# Patient Record
Sex: FEMALE | Race: WHITE | NOT HISPANIC OR LATINO | URBAN - METROPOLITAN AREA
[De-identification: names, ages, dates, MRNs, and addresses within clinical notes are randomized per-mention and may not be internally consistent; named-entity substitution may affect disease eponyms.]

---

## 2018-01-03 ENCOUNTER — OUTPATIENT (OUTPATIENT)
Dept: OUTPATIENT SERVICES | Facility: HOSPITAL | Age: 73
LOS: 1 days | End: 2018-01-03
Payer: MEDICARE

## 2018-01-03 DIAGNOSIS — Z98.89 OTHER SPECIFIED POSTPROCEDURAL STATES: Chronic | ICD-10-CM

## 2018-01-03 PROCEDURE — 73560 X-RAY EXAM OF KNEE 1 OR 2: CPT | Mod: 26,LT

## 2018-01-03 PROCEDURE — 73560 X-RAY EXAM OF KNEE 1 OR 2: CPT

## 2018-01-04 ENCOUNTER — OUTPATIENT (OUTPATIENT)
Dept: OUTPATIENT SERVICES | Facility: HOSPITAL | Age: 73
LOS: 1 days | End: 2018-01-04
Payer: MEDICARE

## 2018-01-04 DIAGNOSIS — Z98.89 OTHER SPECIFIED POSTPROCEDURAL STATES: Chronic | ICD-10-CM

## 2018-01-04 PROCEDURE — 73721 MRI JNT OF LWR EXTRE W/O DYE: CPT

## 2018-01-04 PROCEDURE — 73721 MRI JNT OF LWR EXTRE W/O DYE: CPT | Mod: 26,LT

## 2018-01-30 ENCOUNTER — OUTPATIENT (OUTPATIENT)
Dept: OUTPATIENT SERVICES | Facility: HOSPITAL | Age: 73
LOS: 1 days | End: 2018-01-30
Payer: MEDICARE

## 2018-01-30 DIAGNOSIS — Z98.89 OTHER SPECIFIED POSTPROCEDURAL STATES: Chronic | ICD-10-CM

## 2018-01-30 PROCEDURE — 73560 X-RAY EXAM OF KNEE 1 OR 2: CPT | Mod: 26,LT

## 2018-01-30 PROCEDURE — 73560 X-RAY EXAM OF KNEE 1 OR 2: CPT

## 2018-04-03 ENCOUNTER — OUTPATIENT (OUTPATIENT)
Dept: OUTPATIENT SERVICES | Facility: HOSPITAL | Age: 73
LOS: 1 days | End: 2018-04-03
Payer: MEDICARE

## 2018-04-03 DIAGNOSIS — Z98.89 OTHER SPECIFIED POSTPROCEDURAL STATES: Chronic | ICD-10-CM

## 2018-04-03 PROCEDURE — 73560 X-RAY EXAM OF KNEE 1 OR 2: CPT | Mod: 26,LT

## 2018-04-03 PROCEDURE — 73560 X-RAY EXAM OF KNEE 1 OR 2: CPT

## 2018-09-21 ENCOUNTER — HOSPITAL LABORATORY (OUTPATIENT)
Dept: OTHER | Facility: CLINIC | Age: 73
End: 2018-09-21

## 2018-09-21 LAB
GRAM STN SPEC: ABNORMAL
GRAM STN SPEC: ABNORMAL
Lab: ABNORMAL
SPECIMEN SOURCE: ABNORMAL

## 2018-09-23 LAB
BACTERIA SPEC CULT: ABNORMAL
Lab: ABNORMAL
SPECIMEN SOURCE: ABNORMAL

## 2019-01-25 ENCOUNTER — THERAPY VISIT (OUTPATIENT)
Dept: PHYSICAL THERAPY | Facility: CLINIC | Age: 74
End: 2019-01-25
Payer: MEDICARE

## 2019-01-25 DIAGNOSIS — M54.2 NECK PAIN: Primary | ICD-10-CM

## 2019-01-25 PROCEDURE — 97161 PT EVAL LOW COMPLEX 20 MIN: CPT | Mod: GP | Performed by: PHYSICAL THERAPIST

## 2019-01-25 PROCEDURE — G8982 BODY POS GOAL STATUS: HCPCS | Mod: GP | Performed by: PHYSICAL THERAPIST

## 2019-01-25 PROCEDURE — 97110 THERAPEUTIC EXERCISES: CPT | Mod: GP | Performed by: PHYSICAL THERAPIST

## 2019-01-25 PROCEDURE — G8981 BODY POS CURRENT STATUS: HCPCS | Mod: GP | Performed by: PHYSICAL THERAPIST

## 2019-01-25 NOTE — PROGRESS NOTES
[facility] Initial Evaluation  Subjective:  C/C:  Intermittent left sharp/tingling neck pain that will radiate down into interscapular area, up into left head, upper trap, left upper arm.  Neck is very stiff.  Worse with sitting, better with Advil.  Denies numbness, no complaint of dizziness.  Sleep is disturbed.  Hx:  12/10/18-Insideous onset of left neck pain.  Can give no reason or causal factor.  Saw Dr. Prasad and was referred to PT.  No new imaging at this time.  PMH:  5 yrs ago suffered an episode of severe neck pain, then moved into both knees that jhad TKAs.  Went into hospital With elevated WBC.  Had TKA revisions and swelling from knee was cultured which came back (-).  4-5 months later,suffered a second episode.  Was diagnosed with pseudo gout at that time.  Taking Prednisone for several days gave good relief.  Since that time has suffered other episodes that have responded to prednisone.  This episode was unchaged by prednisone.  10/17/18-Underwent fusion to left foot, bunion repair, tendon repair.  General health:  Good.  Pt works 1 day/wk.                                Objective:  Standing Alignment:    Cervical/Thoracic:  Cervical spine lateral flex L and forward head  Shoulder/UE:  Elevated scapula L, elevated scapula R and rounded shoulders                                  Cervical/Thoracic Evaluation    AROM:  AROM Cervical:    Flexion:            80%  Extension:       50% (+)  Rotation:         Left: 50% (+)     Right: 55% (+)  Side Bend:      Left: 30%     Right:  40% (+)    Strength: Noted tingling in mid left neck post right rotation.  Headaches: cervical  Cervical Myotomes:        C4 (shrug):  Right: 5  C5 (Deltoid):  Left: 5    Right: 5  C6 (Biceps):  Left: 5    Right: 5  C7 (Triceps):  Left: 5    Right: 5    T1 (Intrinsics): Left: 5    Right: 5  DTR's:    C5 (Biceps):  Left:  2  Right:  2     C6 (Brachioradialis):  Left:  2  Right:  2     C7 (Triceps):  Left:  2   Right:  2              Cord Sign:      Cord sign negative for:  Mcmanus left; Mcmanus right; Scapularthoracic left or Scapularthoracic right                                          General     ROS    Assessment/Plan:    Patient is a 73 year old female with cervical complaints.    Patient has the following significant findings with corresponding treatment plan.                Diagnosis 1:  Neck pain  Pain -  manual therapy, self management, education and home program  Decreased ROM/flexibility - manual therapy and therapeutic exercise  Decreased joint mobility - manual therapy and therapeutic exercise  Decreased strength - therapeutic exercise and therapeutic activities  Impaired muscle performance - neuro re-education  Decreased function - therapeutic activities  Impaired posture - neuro re-education    Therapy Evaluation Codes:   1) History comprised of:   Personal factors that impact the plan of care:      None.    Comorbidity factors that impact the plan of care are:      High blood pressure and Osteoarthritis.     Medications impacting care: Anti-inflammatory and Steroids.  2) Examination of Body Systems comprised of:   Body structures and functions that impact the plan of care:      Cervical spine.   Activity limitations that impact the plan of care are:      Sitting.  3) Clinical presentation characteristics are:   Stable/Uncomplicated.  4) Decision-Making    Low complexity using standardized patient assessment instrument and/or measureable assessment of functional outcome.  Cumulative Therapy Evaluation is: Low complexity.    Previous and current functional limitations:  (See Goal Flow Sheet for this information)    Short term and Long term goals: (See Goal Flow Sheet for this information)     Communication ability:  Patient appears to be able to clearly communicate and understand verbal and written communication and follow directions correctly.  Treatment Explanation - The following has been discussed with  the patient:   RX ordered/plan of care  Anticipated outcomes  Possible risks and side effects  This patient would benefit from PT intervention to resume normal activities.   Rehab potential is good.    Frequency:  1 X week, once daily  Duration:  for 8 weeks  Discharge Plan:  Achieve all LTG.  Independent in home treatment program.  Reach maximal therapeutic benefit.    Please refer to the daily flowsheet for treatment today, total treatment time and time spent performing 1:1 timed codes.

## 2019-01-25 NOTE — LETTER
DEPARTMENT OF HEALTH AND HUMAN SERVICES  CENTERS FOR MEDICARE & MEDICAID SERVICES    PLAN/UPDATED PLAN OF PROGRESS FOR OUTPATIENT REHABILITATION    PATIENTS NAME:  Alexus Lynch   : 1945    PROVIDER NUMBER:    [id number]    HICN:  [id number]    PROVIDER NAME: [facility]    MEDICAL RECORD NUMBER: [id number]     START OF CARE DATE:  SOC Date: 19   TYPE:  PT    PRIMARY/TREATMENT DIAGNOSIS: (Pertinent Medical Diagnosis)  Neck pain    VISITS FROM START OF CARE:  Rxs Used: 1     [place] [facility] Initial Evaluation  Subjective:  C/C:  Intermittent left sharp/tingling neck pain that will radiate down into interscapular area, up into left head, upper trap, left upper arm.  Neck is very stiff.  Worse with sitting, better with Advil.  Denies numbness, no complaint of dizziness.  Sleep is disturbed.  Hx:  12/10/18-Insideous onset of left neck pain.  Can give no reason or causal factor.  Saw Dr. Prasad and was referred to PT.  No new imaging at this time.  PMH:  5 yrs ago suffered an episode of severe neck pain, then moved into both knees that jhad TKAs.  Went into hospital With elevated WBC.  Had TKA revisions and swelling from knee was cultured which came back (-).  4-5 months later,suffered a second episode.  Was diagnosed with pseudo gout at that time.  Taking Prednisone for several days gave good relief.  Since that time has suffered other episodes that have responded to prednisone.  This episode was unchaged by prednisone.  10/17/18-Underwent fusion to left foot, bunion repair, tendon repair.  General health:  Good.  Pt works 1 day/wk.Pertinent medical history includes:  Anemia, asthma, high blood pressure, implanted devices, migraines/headaches and osteoarthritis (Pain at night/rest, numbness/tingling).  Medical allergies: yes (Celebrex, sulfur).  Other surgeries include:  Orthopedic surgery.  Current medications:  High blood pressure medication  and anti-inflammatory.  Employment status: Retired.      Objective:  Standing Alignment:    Cervical/Thoracic:  Cervical spine lateral flex L and forward head  Shoulder/UE:  Elevated scapula L, elevated scapula R and rounded shoulders    Cervical/Thoracic Evaluation  AROM:  AROM Cervical:  Flexion:            80%  Extension:       50% (+)  Rotation:         Left: 50% (+)     Right: 55% (+)  Side Bend:      Left: 30%     Right:  40% (+)    PATIENTS NAME:  Alexus Lynch   : 1945    Strength: Noted tingling in mid left neck post right rotation.  Headaches: cervical  Cervical Myotomes:    C4 (shrug):  Right: 5  C5 (Deltoid):  Left: 5    Right: 5  C6 (Biceps):  Left: 5    Right: 5  C7 (Triceps):  Left: 5    Right: 5  T1 (Intrinsics): Left: 5    Right: 5  DTR's:    C5 (Biceps):  Left:  2  Right:  2     C6 (Brachioradialis):  Left:  2  Right:  2     C7 (Triceps):  Left:  2  Right:  2  Cord Sign:    Cord sign negative for:  Mcmanus left; Mcmanus right; Scapularthoracic left or Scapularthoracic right     General   ROS    Assessment/Plan:    Patient is a 73 year old female with cervical complaints.    Patient has the following significant findings with corresponding treatment plan.                Diagnosis 1:  Neck pain  Pain -  manual therapy, self management, education and home program  Decreased ROM/flexibility - manual therapy and therapeutic exercise  Decreased joint mobility - manual therapy and therapeutic exercise  Decreased strength - therapeutic exercise and therapeutic activities  Impaired muscle performance - neuro re-education  Decreased function - therapeutic activities  Impaired posture - neuro re-education    Therapy Evaluation Codes:   1) History comprised of:   Personal factors that impact the plan of care:      None.    Comorbidity factors that impact the plan of care are:      High blood pressure and Osteoarthritis.     Medications impacting care: Anti-inflammatory and Steroids.  2) Examination of  "Body Systems comprised of:   Body structures and functions that impact the plan of care:      Cervical spine.   Activity limitations that impact the plan of care are:      Sitting.  3) Clinical presentation characteristics are:   Stable/Uncomplicated.  4) Decision-Making    Low complexity using standardized patient assessment instrument and/or measureable assessment of functional outcome.  Cumulative Therapy Evaluation is: Low complexity.      PATIENTS NAME:  Alexus Lynch   : 1945    Communication ability:  Patient appears to be able to clearly communicate and understand verbal and written communication and follow directions correctly.  Treatment Explanation - The following has been discussed with the patient:   RX ordered/plan of care  Anticipated outcomes  Possible risks and side effects  This patient would benefit from PT intervention to resume normal activities.   Rehab potential is good.  Frequency:  1 X week, once daily  Duration:  for 8 weeks  Discharge Plan:  Achieve all LTG.  Independent in home treatment program.  Reach maximal therapeutic benefit.      Caregiver Signature/Credentials _____________________________ Date ________       Treating Provider: Corinne Serrano, PT, ScD, MOMT     I have reviewed and certified the need for these services and plan of treatment while under my care.        PHYSICIAN'S SIGNATURE:  ______________________________________  Date___________                       Tyler Prasad MD    Certification period:  Beginning of Cert date period: 19 to  End of Cert period date: 19     Functional Level Progress Report: Please see attached \"Goal Flow sheet for Functional level.\"    ____X____Continue Services or________ DC Services                Service dates: From  SOC Date: 19 date to present                         "

## 2019-01-28 ENCOUNTER — THERAPY VISIT (OUTPATIENT)
Dept: PHYSICAL THERAPY | Facility: CLINIC | Age: 74
End: 2019-01-28
Payer: MEDICARE

## 2019-01-28 DIAGNOSIS — M54.2 NECK PAIN: ICD-10-CM

## 2019-01-28 PROCEDURE — 97140 MANUAL THERAPY 1/> REGIONS: CPT | Mod: GP | Performed by: PHYSICAL THERAPIST

## 2019-01-28 PROCEDURE — 97035 APP MDLTY 1+ULTRASOUND EA 15: CPT | Mod: GP | Performed by: PHYSICAL THERAPIST

## 2019-01-28 PROCEDURE — 97110 THERAPEUTIC EXERCISES: CPT | Mod: GP | Performed by: PHYSICAL THERAPIST

## 2019-01-29 NOTE — PROGRESS NOTES
[facility] Initial Evaluation  Subjective:                                       Pertinent medical history includes:  Anemia, asthma, high blood pressure, implanted devices, migraines/headaches and osteoarthritis (Pain at night/rest, numbness/tingling).  Medical allergies: yes (Celebrex, sulfur).  Other surgeries include:  Orthopedic surgery.  Current medications:  High blood pressure medication and anti-inflammatory.    Employment status: Retired.                                  Objective:  System    Physical Exam    General     ROS    Assessment/Plan:

## 2019-02-05 ENCOUNTER — THERAPY VISIT (OUTPATIENT)
Dept: PHYSICAL THERAPY | Facility: CLINIC | Age: 74
End: 2019-02-05
Payer: MEDICARE

## 2019-02-05 DIAGNOSIS — M54.2 NECK PAIN: ICD-10-CM

## 2019-02-05 PROCEDURE — 97140 MANUAL THERAPY 1/> REGIONS: CPT | Mod: GP | Performed by: PHYSICAL THERAPIST

## 2019-02-05 PROCEDURE — 97035 APP MDLTY 1+ULTRASOUND EA 15: CPT | Mod: GP | Performed by: PHYSICAL THERAPIST

## 2019-02-05 PROCEDURE — 97110 THERAPEUTIC EXERCISES: CPT | Mod: GP | Performed by: PHYSICAL THERAPIST

## 2019-02-12 ENCOUNTER — THERAPY VISIT (OUTPATIENT)
Dept: PHYSICAL THERAPY | Facility: CLINIC | Age: 74
End: 2019-02-12
Payer: MEDICARE

## 2019-02-12 DIAGNOSIS — M54.2 NECK PAIN: ICD-10-CM

## 2019-02-12 PROCEDURE — 97140 MANUAL THERAPY 1/> REGIONS: CPT | Mod: GP | Performed by: PHYSICAL THERAPIST

## 2019-02-12 PROCEDURE — 97110 THERAPEUTIC EXERCISES: CPT | Mod: GP | Performed by: PHYSICAL THERAPIST

## 2019-02-12 PROCEDURE — 97035 APP MDLTY 1+ULTRASOUND EA 15: CPT | Mod: GP | Performed by: PHYSICAL THERAPIST

## 2019-02-28 ENCOUNTER — THERAPY VISIT (OUTPATIENT)
Dept: PHYSICAL THERAPY | Facility: CLINIC | Age: 74
End: 2019-02-28
Payer: MEDICARE

## 2019-02-28 DIAGNOSIS — M54.2 NECK PAIN: ICD-10-CM

## 2019-02-28 PROCEDURE — 97112 NEUROMUSCULAR REEDUCATION: CPT | Mod: GP | Performed by: PHYSICAL THERAPIST

## 2019-02-28 PROCEDURE — G8982 BODY POS GOAL STATUS: HCPCS | Mod: GP | Performed by: PHYSICAL THERAPIST

## 2019-02-28 PROCEDURE — G8983 BODY POS D/C STATUS: HCPCS | Mod: GP | Performed by: PHYSICAL THERAPIST

## 2019-02-28 PROCEDURE — 97140 MANUAL THERAPY 1/> REGIONS: CPT | Mod: GP | Performed by: PHYSICAL THERAPIST

## 2019-02-28 PROCEDURE — 97035 APP MDLTY 1+ULTRASOUND EA 15: CPT | Mod: GP | Performed by: PHYSICAL THERAPIST

## 2019-02-28 NOTE — PROGRESS NOTES
Subjective:  HPI                    Objective:  System    Physical Exam    General     ROS    Assessment/Plan:    DISCHARGE REPORT    Progress reporting period is from 01/25/19 to 2/28/19.       SUBJECTIVE  Subjective changes noted by patient:  .  Subjective: Pt feels that she is significantly better.  Has returned from traveling to [place] which she tolerated well.  Is able to tolerate ex well.  Still will note mild tingling in left lat neck, but improved.  Feels she can mange neck pain with current HEP.    Current pain level is   .     Previous pain level was   Initial Pain level: 6/10.   Changes in function:  Yes (See Goal flowsheet attached for changes in current functional level)  Adverse reaction to treatment or activity: None    OBJECTIVE  Changes noted in objective findings:    Objective: AROM of cervical spine: Flex-90%, ext-40%, R rot-90%, L rot-65-70%, SB-70% B.  Pt is indepednent with HEP.       ASSESSMENT/PLAN  Updated problem list and treatment plan: Diagnosis 1:  Neck pain  Pain -  US, manual therapy, self management, education and home program  Decreased ROM/flexibility - manual therapy and therapeutic exercise  Decreased joint mobility - manual therapy and therapeutic exercise  Decreased strength - therapeutic exercise and therapeutic activities  Impaired muscle performance - neuro re-education  Decreased function - therapeutic activities  Impaired posture - neuro re-education  STG/LTGs have been met or progress has been made towards goals:  Yes (See Goal flow sheet completed today.)  Assessment of Progress: The patient's condition is improving.  Self Management Plans:  Patient is independent in a home treatment program.  Patient is independent in self management of symptoms.    Alexus continues to require the following intervention to meet STG and LTG's:  PT    Recommendations:  This patient is ready to be discharged from therapy and continue their home treatment program.    Please refer to the  daily flowsheet for treatment today, total treatment time and time spent performing 1:1 timed codes.

## 2019-02-28 NOTE — LETTER
[facility]  [address]  [facility] [address]  [phone]    2019    Re: Alexus Lynch   :   1945  MRN:  [id number]   REFERRING PHYSICIAN:   Tyler Prasad    [facility]    Date of Initial Evaluation:  2019  Visits:  Rxs Used: 5  Reason for Referral:  Neck pain    DISCHARGE REPORT  Progress reporting period is from 19 to 19.       SUBJECTIVE  Subjective changes noted by patient:  .  Subjective: Pt feels that she is significantly better.  Has returned from traveling to [place] which she tolerated well.  Is able to tolerate ex well.  Still will note mild tingling in left lat neck, but improved.  Feels she can mange neck pain with current HEP.    Current pain level is   .     Previous pain level was   Initial Pain level: 6/10.   Changes in function:  Yes (See Goal flowsheet attached for changes in current functional level)  Adverse reaction to treatment or activity: None    OBJECTIVE  Changes noted in objective findings:    Objective: AROM of cervical spine: Flex-90%, ext-40%, R rot-90%, L rot-65-70%, SB-70% B.  Pt is indepednent with HEP.       ASSESSMENT/PLAN  Updated problem list and treatment plan: Diagnosis 1:  Neck pain  Pain -  US, manual therapy, self management, education and home program  Decreased ROM/flexibility - manual therapy and therapeutic exercise  Decreased joint mobility - manual therapy and therapeutic exercise  Decreased strength - therapeutic exercise and therapeutic activities  Impaired muscle performance - neuro re-education  Decreased function - therapeutic activities  Impaired posture - neuro re-education  STG/LTGs have been met or progress has been made towards goals:  Yes (See Goal flow sheet completed today.)  Assessment of Progress: The patient's condition is improving.  Self Management Plans:  Patient is independent in a home treatment  program.  Patient is independent in self management of symptoms.  Alexus continues to require the following intervention to meet STG and LTG's:  PT        Recommendations:  This patient is ready to be discharged from therapy and continue their home treatment program.    Thank you for your referral.    INQUIRIES  Therapist: Corinne Serrano PT ScD [facility]  [address] [place] [address] [id number]  [facility] [address]  Phone: [phone]  Fax: [phone]

## 2021-06-02 ENCOUNTER — THERAPY VISIT (OUTPATIENT)
Dept: PHYSICAL THERAPY | Facility: CLINIC | Age: 76
End: 2021-06-02
Payer: COMMERCIAL

## 2021-06-02 DIAGNOSIS — M54.2 NECK PAIN: Primary | ICD-10-CM

## 2021-06-02 PROCEDURE — 97110 THERAPEUTIC EXERCISES: CPT | Mod: GP | Performed by: PHYSICAL THERAPIST

## 2021-06-02 PROCEDURE — 97161 PT EVAL LOW COMPLEX 20 MIN: CPT | Mod: GP | Performed by: PHYSICAL THERAPIST

## 2021-06-02 NOTE — LETTER
"[address]  [facility] [address]  [phone]    Jeannie 3, 2021    Re: Alexus Lynch   :   1945  MRN:  [id number]   REFERRING PHYSICIAN:   Jeff SANCHEZ [facility]  Date of Initial Evaluation:  2021  Visits:  Rxs Used: 8 per MD  Reason for Referral:  Neck pain    Physical Therapy Initial Evaluation  Subjective:  Patient presents to PT for treatment of neck pain with referral dated 2021.  She reports fluctuating neck pain for several years but recent worsening of symptoms began about 4 weeks ago, without precipitating event.  Patient complains of bilateral neck pain rated 6/10 which radiates up into her head, with daily headaches.  Her neck mobility is significantly limited.  Patient also has bilateral RCT's and anticipates eventual TSA surgeries.  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  She suffers from pseudo gout which often presents as neck pain before traveling to other joints.  The prednisone she takes for this did not affect current symptoms.  Patient reports recent MRI showed \"only 2 discs left at cervical spine, with bottom 3 vertebra fused and arthritis at C3-C4 which is putting pressure on the spinal cord\".  The history is provided by the patient.   Patient Health History  Alexus Lynch being seen for neck.   Date of Onset: years ago, but 4 weeks ago most recentlly.   Problem occurred: arthritis   Pain is reported as 6/10 on pain scale.  General health as reported by patient is fair.  Pertinent medical history includes: high blood pressure, migraines/headaches and osteoarthritis.   Other medical allergies details: celebrex.   Surgeries include:  Orthopedic surgery. Other surgery history details: B TKA, left foot fusion.    Current medications:  Anti-inflammatory, pain medication and high blood pressure medication.    Current occupation is retired, part time " work at [employer].   Primary job tasks include:  Driving and lifting/carrying.      Objective:  Standing Alignment:    Cervical/Thoracic:  Forward head (significant step off at cervical spine)  Shoulder/UE:  Depressed scapula R  scoliosis  Cervical/Thoracic Evaluation  AROM:  AROM Cervical:  Re: Alexus Lynch   :   1945    Flexion:            Min loss with right greater than left neck pain which radiates up head  Extension:       Max loss with increased NP  Rotation:         Left: mod loss with tightness L neck     Right: max loss with right NP  Side Bend:      Left: mod/max loss with stirrness complaint     Right:  Max loss with right NP  Headaches: cervical  DTR's:  normal  Cervical Palpation:    Tenderness present at Left:    Upper Trap; Levator; Erector Spinae and Suboccipitals  Tenderness present at Right:    Upper Trap; Levator; Erector Spinae and Suboccipitals  Functional Tests:    Core strength and proprioception:  Unable to elevate bilateral UE above shoulder level due to RCT's     Assessment/Plan:    Patient is a 76 year old female with cervical complaints.    Patient has the following significant findings with corresponding treatment plan.                Diagnosis 1:  Neck pain with significant degenerative changes  Pain -  manual therapy, self management, education and home program  Decreased ROM/flexibility - manual therapy and therapeutic exercise  Decreased function - therapeutic activities    Therapy Evaluation Codes:   1) History comprised of:   Personal factors that impact the plan of care:      None.    Comorbidity factors that impact the plan of care are:      None.     Medications impacting care: None.  2) Examination of Body Systems comprised of:   Body structures and functions that impact the plan of care:      Cervical spine.   Activity limitations that impact the plan of care are:      Lifting, Working and Sleeping.  3) Clinical presentation characteristics  are:   Stable/Uncomplicated.  4) Decision-Making    Low complexity using standardized patient assessment instrument and/or measureable assessment of functional outcome.  Cumulative Therapy Evaluation is: Low complexity.    Previous and current functional limitations:  (See Goal Flow Sheet for this information)    Short term and Long term goals: (See Goal Flow Sheet for this information)     Communication ability:  Patient appears to be able to clearly communicate and understand verbal and written communication and follow directions correctly.  Treatment Explanation - The following has been discussed with the patient:   RX ordered/plan of care  Anticipated outcomes; Possible risks and side effects  This patient would benefit from PT intervention to resume normal activities.   Rehab potential is good.  Re: Alexus Lynch   :   1945    Frequency:  1 X week, once daily  Duration:  for 8 weeks  Discharge Plan:  Achieve all LTG.  Independent in home treatment program.  Reach maximal therapeutic benefit.    Attestation signed by Yeo, Albert, MD at 2021  5:31 PM:  Physician Attestation   I, Albert Yeo, have reviewed and discussed with the advanced practice provider their history, physical and plan for Alexus Lynch. I did not participate in a shared visit by interviewing or examining the patient and this should be billed as an advanced practice provider only visit.    Albert Yeo  Date of Service (when I saw the patient): I did not personally see this patient today.      Thank you for your referral.    INQUIRIES  Therapist:Madelin Mariano, PT [facility] [address] [address]  Phone: [phone]  Fax: [phone]

## 2021-06-02 NOTE — PROGRESS NOTES
"Physical Therapy Initial Evaluation  Subjective:  Patient presents to PT for treatment of neck pain with referral dated 5/27/2021.  She reports fluctuating neck pain for several years but recent worsening of symptoms began about 4 weeks ago, without precipitating event.  Patient complains of bilateral neck pain rated 6/10 which radiates up into her head, with daily headaches.  Her neck mobility is significantly limited.  Patient also has bilateral RCT's and anticipates eventual TSA surgeries.  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  She suffers from pseudo gout which often presents as neck pain before traveling to other joints.  The prednisone she takes for this did not affect current symptoms.  Patient reports recent MRI showed \"only 2 discs left at cervical spine, with bottom 3 vertebra fused and arthritis at C3-C4 which is putting pressure on the spinal cord\".    The history is provided by the patient.   Patient Health History  Alexus Lynch being seen for neck.     Date of Onset: years ago, but 4 weeks ago most recentlly.   Problem occurred: arthritis   Pain is reported as 6/10 on pain scale.  General health as reported by patient is fair.  Pertinent medical history includes: high blood pressure, migraines/headaches and osteoarthritis.      Other medical allergies details: celebrex.   Surgeries include:  Orthopedic surgery. Other surgery history details: B TKA, left foot fusion.    Current medications:  Anti-inflammatory, pain medication and high blood pressure medication.    Current occupation is retired, part time work at [employer].   Primary job tasks include:  Driving and lifting/carrying.                                    Objective:  Standing Alignment:    Cervical/Thoracic:  Forward head (significant step off at cervical spine)  Shoulder/UE:  Depressed scapula R                    scoliosis              Cervical/Thoracic Evaluation    AROM:  AROM Cervical:    Flexion:        "     Min loss with right greater than left neck pain which radiates up head  Extension:       Max loss with increased NP  Rotation:         Left: mod loss with tightness L neck     Right: max loss with right NP  Side Bend:      Left: mod/max loss with stirrness complaint     Right:  Max loss with right NP      Headaches: cervical    DTR's:  normal            Cervical Palpation:    Tenderness present at Left:    Upper Trap; Levator; Erector Spinae and Suboccipitals  Tenderness present at Right:    Upper Trap; Levator; Erector Spinae and Suboccipitals  Functional Tests:    Core strength and proprioception:  Unable to elevate bilateral UE above shoulder level due to RCT's                                                General     ROS    Assessment/Plan:    Patient is a 76 year old female with cervical complaints.    Patient has the following significant findings with corresponding treatment plan.                Diagnosis 1:  Neck pain with significant degenerative changes  Pain -  manual therapy, self management, education and home program  Decreased ROM/flexibility - manual therapy and therapeutic exercise  Decreased function - therapeutic activities    Therapy Evaluation Codes:   1) History comprised of:   Personal factors that impact the plan of care:      None.    Comorbidity factors that impact the plan of care are:      None.     Medications impacting care: None.  2) Examination of Body Systems comprised of:   Body structures and functions that impact the plan of care:      Cervical spine.   Activity limitations that impact the plan of care are:      Lifting, Working and Sleeping.  3) Clinical presentation characteristics are:   Stable/Uncomplicated.  4) Decision-Making    Low complexity using standardized patient assessment instrument and/or measureable assessment of functional outcome.  Cumulative Therapy Evaluation is: Low complexity.    Previous and current functional limitations:  (See Goal Flow Sheet for  this information)    Short term and Long term goals: (See Goal Flow Sheet for this information)     Communication ability:  Patient appears to be able to clearly communicate and understand verbal and written communication and follow directions correctly.  Treatment Explanation - The following has been discussed with the patient:   RX ordered/plan of care  Anticipated outcomes  Possible risks and side effects  This patient would benefit from PT intervention to resume normal activities.   Rehab potential is good.    Frequency:  1 X week, once daily  Duration:  for 8 weeks  Discharge Plan:  Achieve all LTG.  Independent in home treatment program.  Reach maximal therapeutic benefit.    Please refer to the daily flowsheet for treatment today, total treatment time and time spent performing 1:1 timed codes.

## 2021-06-09 ENCOUNTER — THERAPY VISIT (OUTPATIENT)
Dept: PHYSICAL THERAPY | Facility: CLINIC | Age: 76
End: 2021-06-09
Payer: COMMERCIAL

## 2021-06-09 DIAGNOSIS — M54.2 NECK PAIN: ICD-10-CM

## 2021-06-09 PROCEDURE — 97140 MANUAL THERAPY 1/> REGIONS: CPT | Mod: GP | Performed by: PHYSICAL THERAPIST

## 2021-06-09 PROCEDURE — 97110 THERAPEUTIC EXERCISES: CPT | Mod: GP | Performed by: PHYSICAL THERAPIST

## 2021-06-18 ENCOUNTER — THERAPY VISIT (OUTPATIENT)
Dept: PHYSICAL THERAPY | Facility: CLINIC | Age: 76
End: 2021-06-18
Payer: COMMERCIAL

## 2021-06-18 DIAGNOSIS — M54.2 NECK PAIN: ICD-10-CM

## 2021-06-18 PROCEDURE — 97140 MANUAL THERAPY 1/> REGIONS: CPT | Mod: GP | Performed by: PHYSICAL THERAPIST

## 2021-06-18 PROCEDURE — 97110 THERAPEUTIC EXERCISES: CPT | Mod: GP | Performed by: PHYSICAL THERAPIST

## 2021-06-25 ENCOUNTER — THERAPY VISIT (OUTPATIENT)
Dept: PHYSICAL THERAPY | Facility: CLINIC | Age: 76
End: 2021-06-25
Payer: COMMERCIAL

## 2021-06-25 DIAGNOSIS — M54.2 NECK PAIN: ICD-10-CM

## 2021-06-25 PROCEDURE — 97110 THERAPEUTIC EXERCISES: CPT | Mod: GP | Performed by: PHYSICAL THERAPIST

## 2021-06-25 PROCEDURE — 97140 MANUAL THERAPY 1/> REGIONS: CPT | Mod: GP | Performed by: PHYSICAL THERAPIST

## 2021-07-05 ENCOUNTER — THERAPY VISIT (OUTPATIENT)
Dept: PHYSICAL THERAPY | Facility: CLINIC | Age: 76
End: 2021-07-05
Payer: COMMERCIAL

## 2021-07-05 DIAGNOSIS — M54.2 NECK PAIN: ICD-10-CM

## 2021-07-05 PROCEDURE — 97035 APP MDLTY 1+ULTRASOUND EA 15: CPT | Mod: GP | Performed by: PHYSICAL THERAPIST

## 2021-07-05 PROCEDURE — 97110 THERAPEUTIC EXERCISES: CPT | Mod: GP | Performed by: PHYSICAL THERAPIST

## 2021-07-05 PROCEDURE — 97140 MANUAL THERAPY 1/> REGIONS: CPT | Mod: GP | Performed by: PHYSICAL THERAPIST

## 2021-07-16 ENCOUNTER — THERAPY VISIT (OUTPATIENT)
Dept: PHYSICAL THERAPY | Facility: CLINIC | Age: 76
End: 2021-07-16
Payer: COMMERCIAL

## 2021-07-16 DIAGNOSIS — M54.2 NECK PAIN: ICD-10-CM

## 2021-07-16 PROCEDURE — 97140 MANUAL THERAPY 1/> REGIONS: CPT | Mod: GP | Performed by: PHYSICAL THERAPIST

## 2021-07-16 PROCEDURE — 97110 THERAPEUTIC EXERCISES: CPT | Mod: GP | Performed by: PHYSICAL THERAPIST

## 2021-07-16 PROCEDURE — 97035 APP MDLTY 1+ULTRASOUND EA 15: CPT | Mod: GP | Performed by: PHYSICAL THERAPIST

## 2021-07-21 ENCOUNTER — THERAPY VISIT (OUTPATIENT)
Dept: PHYSICAL THERAPY | Facility: CLINIC | Age: 76
End: 2021-07-21
Payer: COMMERCIAL

## 2021-07-21 DIAGNOSIS — M54.2 NECK PAIN: ICD-10-CM

## 2021-07-21 PROCEDURE — 97035 APP MDLTY 1+ULTRASOUND EA 15: CPT | Mod: GP | Performed by: PHYSICAL THERAPIST

## 2021-07-21 PROCEDURE — 97140 MANUAL THERAPY 1/> REGIONS: CPT | Mod: GP | Performed by: PHYSICAL THERAPIST

## 2021-07-21 PROCEDURE — 97110 THERAPEUTIC EXERCISES: CPT | Mod: GP | Performed by: PHYSICAL THERAPIST

## 2021-07-28 ENCOUNTER — THERAPY VISIT (OUTPATIENT)
Dept: PHYSICAL THERAPY | Facility: CLINIC | Age: 76
End: 2021-07-28
Payer: COMMERCIAL

## 2021-07-28 DIAGNOSIS — M54.2 NECK PAIN: ICD-10-CM

## 2021-07-28 PROCEDURE — 97035 APP MDLTY 1+ULTRASOUND EA 15: CPT | Mod: GP | Performed by: PHYSICAL THERAPIST

## 2021-07-28 PROCEDURE — 97140 MANUAL THERAPY 1/> REGIONS: CPT | Mod: GP | Performed by: PHYSICAL THERAPIST

## 2021-07-28 PROCEDURE — 97110 THERAPEUTIC EXERCISES: CPT | Mod: GP | Performed by: PHYSICAL THERAPIST

## 2021-07-28 NOTE — LETTER
DANIEL [address]  [facility] [address]  [phone]  2021    Re: Alexus Lynch   :   1945  MRN:  [id number]   REFERRING PHYSICIAN:   Jeff SANCHEZ [facility]  Date of Initial Evaluation:  2021  Visits:  Rxs Used: 8  Reason for Referral:  Neck pain    DISCHARGE REPORT  Progress reporting period is from 2021 to 2021.  Alexus has been seen in PT 8 times per referral for treatment of neck pain/arhtritis.  Treatment has included US, gentle manual therapy and gentle HEP.  Neck pain and headaches have fluctuated over the course of treatment.   Patient is under care of rheumatologist and takes prednisone.    SUBJECTIVE  Subjective: Overall fluctuating neck relief and exacerbation.  Had significant neck pain 3 days ago, perhaps related to increased increased activity - lifting, reachin, pulling.  Today feeling pretty good.    Current Pain level: 3/10.     Previous pain level was 6/10   Initial Pain level: 6/10.   Changes in function:  See goal flow sheet  Adverse reaction to treatment or activity: None    OBJECTIVE  Changes noted in objective findings:    Objective: Cervical ROM:  flexion no loss - relieving.  ext - mod loss without complaint, SB L mod loss with stiffness, SB R max loss with stiffness, Rot L mild loss without complaint, Rot R mild loss with R NP.  UE elevation limited to 30 deg on right, 80 deg on left, with scapular substitution     ASSESSMENT/PLAN  STG/LTGs have been met or progress has been made towards goals: See goal flow sheet  Assessment of Progress: The patient's condition has potential to improve.  Self Management Plans:  Patient is independent in a home treatment program.  Alexus continues to require the following intervention to meet STG and LTG's:  PT intervention is no longer required to meet STG/LTG.    Recommendations:  This patient is ready to be discharged from  therapy and continue their home treatment program.    Thank you for your referral.    INQUIRIES  Therapist: Madelin Mariano PT, [address]  [facility] [address]  Phone: [phone]  Fax: [phone]

## 2021-07-28 NOTE — PROGRESS NOTES
DISCHARGE REPORT    Progress reporting period is from 6/2/2021 to 7/28/2021.  Alexus has been seen in PT 8 times per referral for treatment of neck pain/arhtritis.  Treatment has included US, gentle manual therapy and gentle HEP.  Neck pain and headaches have fluctuated over the course of treatment.   Patient is under care of rheumatologist and takes prednisone.    SUBJECTIVE    Subjective: Overall fluctuating neck relief and exacerbation.  Had significant neck pain 3 days ago, perhaps related to increased increased activity - lifting, reachin, pulling.  Today feeling pretty good.     Current Pain level: 3/10.     Previous pain level was 6/10   Initial Pain level: 6/10.   Changes in function:  See goal flow sheet    Adverse reaction to treatment or activity: None    OBJECTIVE  Changes noted in objective findings:    Objective: Cervical ROM:  flexion no loss - relieving.  ext - mod loss without complaint, SB L mod loss with stiffness, SB R max loss with stiffness, Rot L mild loss without complaint, Rot R mild loss with R NP.  UE elevation limited to 30 deg on right, 80 deg on left, with scapular substitution     ASSESSMENT/PLAN    STG/LTGs have been met or progress has been made towards goals:  See goal flow sheet  Assessment of Progress: The patient's condition has potential to improve.  Self Management Plans:  Patient is independent in a home treatment program.    Alexus continues to require the following intervention to meet STG and LTG's:  PT intervention is no longer required to meet STG/LTG.    Recommendations:  This patient is ready to be discharged from therapy and continue their home treatment program.    Please refer to the daily flowsheet for treatment today, total treatment time and time spent performing 1:1 timed codes.

## 2021-12-14 ENCOUNTER — THERAPY VISIT (OUTPATIENT)
Dept: PHYSICAL THERAPY | Facility: CLINIC | Age: 76
End: 2021-12-14
Payer: COMMERCIAL

## 2021-12-14 DIAGNOSIS — M54.50 LOW BACK PAIN: ICD-10-CM

## 2021-12-14 DIAGNOSIS — M54.2 NECK PAIN: Primary | ICD-10-CM

## 2021-12-14 PROCEDURE — 97161 PT EVAL LOW COMPLEX 20 MIN: CPT | Mod: GP | Performed by: PHYSICAL THERAPIST

## 2021-12-14 PROCEDURE — 97110 THERAPEUTIC EXERCISES: CPT | Mod: GP | Performed by: PHYSICAL THERAPIST

## 2021-12-14 NOTE — PROGRESS NOTES
[facility]    OUTPATIENT Physical Therapy ORTHOPEDIC EVALUATION  PLAN OF TREATMENT FOR OUTPATIENT REHABILITATION  (COMPLETE FOR INITIAL CLAIMS ONLY)  Patient's Last Name, First Name, M.I.  YOB: 1945  Alexus Lynch    Provider s Name:  [facility]   Medical Record No.  [id number]   Start of Care Date:  12/14/21   Onset Date:    (referral 9/21/2021)   Type:     _X__PT   ___OT Medical Diagnosis:    Encounter Diagnoses   Name Primary?     Low back pain      Neck pain Yes        Treatment Diagnosis:  chronic neck kpain        Goals:     12/14/21 0500   Body Part   Goals listed below are for neck   Goal #1   Goal #1 sleeping   Previous Functional Level No restrictions   Current Functional Level 2-3 hours without sleep per night   STG Target Performance 1-2 hours without sleep per night   Rationale to establish restorative sleep pattern   Due Date 01/04/22   LTG Target Performance Sleep through the night with use of meds   Rationale to establish restorative sleep pattern   Due Date 01/25/22   Goal #2   Goal #2 headaches   Previous Functional Level Headache frequency per week was   Performance Level 1   Current Functional Level Headache frequency per day is   Performance Level daily   STG Target Performance Reduce frequency of headaches in a week to   Performance Level 3   Rationale for full and safe concentration   Due Date 01/04/22   LTG Target Performance Reduce frequency of headaches in a week to   Performance Level 1   Rationale for full and safe concentration;to establish restorative sleep pattern;to improve quality of life and resume normal social activities   Due Date 01/25/22         Therapy Frequency:  1x/week  Predicted Duration of Therapy Intervention:  6 weeks    Madelin Mariano PT                 I CERTIFY THE NEED FOR THESE SERVICES FURNISHED UNDER         THIS PLAN OF TREATMENT AND WHILE UNDER MY CARE .             Physician Signature               Date    X_____________________________________________________                             Certification Date From:  12/14/21   Certification Date To:  02/22/22    Referring Provider:  Jeff Acevedo    Initial Assessment        See Epic Evaluation SOC Date: 12/14/21                                                                                                                                          [facility]    OUTPATIENT Physical Therapy ORTHOPEDIC EVALUATION  PLAN OF TREATMENT FOR OUTPATIENT REHABILITATION  (COMPLETE FOR INITIAL CLAIMS ONLY)  Patient's Last Name, First Name, M.I.  YOB: 1945  Alexus Lynch    Provider s Name:  [facility]   Medical Record No.  [id number]   Start of Care Date:  12/14/21   Onset Date:    (referral 9/21/2021)   Type:     _X__PT   ___OT Medical Diagnosis:    Encounter Diagnoses   Name Primary?     Low back pain      Neck pain Yes        Treatment Diagnosis:  chronic neck kpain        Goals:     12/14/21 0500   Body Part   Goals listed below are for neck   Goal #1   Goal #1 sleeping   Previous Functional Level No restrictions   Current Functional Level 2-3 hours without sleep per night   STG Target Performance 1-2 hours without sleep per night   Rationale to establish restorative sleep pattern   Due Date 01/04/22   LTG Target Performance Sleep through the night with use of meds   Rationale to establish restorative sleep pattern   Due Date 01/25/22   Goal #2   Goal #2 headaches   Previous Functional Level Headache frequency per week was   Performance Level 1   Current Functional Level Headache frequency per day is   Performance Level daily   STG Target Performance Reduce frequency of headaches in a week to   Performance Level 3   Rationale for full and safe concentration   Due Date 01/04/22   LTG Target Performance  Reduce frequency of headaches in a week to   Performance Level 1   Rationale for full and safe concentration;to establish restorative sleep pattern;to improve quality of life and resume normal social activities   Due Date 01/25/22       Therapy Frequency:  1x/week  Predicted Duration of Therapy Intervention:  6 weeks    Madelin Mariano, PT                 I CERTIFY THE NEED FOR THESE SERVICES FURNISHED UNDER        THIS PLAN OF TREATMENT AND WHILE UNDER MY CARE .             Physician Signature               Date    X_____________________________________________________                             Certification Date From:  12/14/21   Certification Date To:  02/22/22    Referring Provider:  Jeff Acevedo    Initial Assessment        See Epic Evaluation SOC Date: 12/14/21

## 2021-12-14 NOTE — PROGRESS NOTES
"Physical Therapy Initial Evaluation  Subjective:  Patient presents to PT for treatment of neck pain with referral dated 9/21/2021.  She has a several year history of neck pain with significant cervical spine arthritis.  She reports recent MRI showed \"only 2 discs left at cervical spine, with bottom 3 vertebra fused and arthritis at C3-C4 which is putting pressure on the spinal cord\".  Patient had flare up of symptoms with severe neck pain and headache in September of 2021.  About a month ago she was treated with a higher dose of prednisone from what she usually takes.  She reports diagnosis of pseudo gout which often presents as neck pain before traveling to other joints. Currently symptoms have decreased somewhat, perhaps due to the prednisone, or Gabapentin or gentle manual traction provided by her .  She currently complains of left neck pain which radiates up side of head.  Pain interferes with sleep and is worse with lifting, reading/computer use and lifting.  Patient has bilateral RCT's with inability to raise right UE.  She has history of bilateral TKA's and a fused ankle.    The history is provided by the patient.   Patient Health History  Alexus Lynch being seen for neck pain.     Date of Onset: several years ago.   Problem occurred: arthritis   Pain score: 2-8/10.  General health as reported by patient is fair.  Pertinent medical history includes: high blood pressure, migraines/headaches and osteoarthritis.   Red flags:  Pain at rest/night.   Other medical allergies details: celebrex.   Surgeries include:  Orthopedic surgery. Other surgery history details: bilateral TKA.    Current medications:  Anti-inflammatory, high blood pressure medication, pain medication and steroids.    Current occupation is retired.   Primary job tasks include:  Lifting/carrying.                                    Objective:  Standing Alignment:    Cervical/Thoracic:  Forward head  Shoulder/upper extremity deviations " alignment: square shoulders.                                  Cervical/Thoracic Evaluation    AROM:  AROM Cervical:    Flexion:            Min loss - feels good  Extension:       Min-mod loss without complaint  Rotation:         Left: mod loss with left neck pain     Right: mod loss with right neck pain  Side Bend:      Left: mod loss with right neck complaint     Right:  Max loss with left neck pain      Headaches: cervical  Cervical Myotomes:  normal                  DTR's:  normal            Cervical Palpation:    Tenderness present at Left:    Erector Spinae and Suboccipitals  Tenderness present at Right:    Upper Trap                                                Patient can lift left UE overhead with momentum.  She is unable to flex her left shoulder against gravity  General     ROS    Assessment/Plan:    Patient is a 76 year old female with cervical complaints.    Patient has the following significant findings with corresponding treatment plan.                Diagnosis 1:  Neck pain    Therapy Evaluation Codes:   1) History comprised of:   Personal factors that impact the plan of care:      None.    Comorbidity factors that impact the plan of care are:      None.     Medications impacting care: None.  2) Examination of Body Systems comprised of:   Body structures and functions that impact the plan of care:      Cervical spine.   Activity limitations that impact the plan of care are:      Driving, Lifting and Sleeping.  3) Clinical presentation characteristics are:   Stable/Uncomplicated.  4) Decision-Making    Low complexity using standardized patient assessment instrument and/or measureable assessment of functional outcome.  Cumulative Therapy Evaluation is: Low complexity.    Previous and current functional limitations:  (See Goal Flow Sheet for this information)    Short term and Long term goals: (See Goal Flow Sheet for this information)     Communication ability:  Patient appears to be able to clearly  communicate and understand verbal and written communication and follow directions correctly.  Treatment Explanation - The following has been discussed with the patient:   RX ordered/plan of care  Anticipated outcomes  Possible risks and side effects  This patient would benefit from PT intervention to resume normal activities.   Rehab potential is good.    Frequency:  1 X week, once daily  Duration:  for 6 weeks  Discharge Plan:  Achieve all LTG.  Independent in home treatment program.  Reach maximal therapeutic benefit.    Please refer to the daily flowsheet for treatment today, total treatment time and time spent performing 1:1 timed codes.

## 2021-12-16 NOTE — PROGRESS NOTES
Physical Therapy Initial Evaluation  Subjective:    Patient Health History             Pertinent medical history includes: asthma, high blood pressure, migraines/headaches and osteoarthritis.   Red flags:  Pain at rest/night.  Medical allergies: Celebrex.   Surgeries include:  Orthopedic surgery.    Current medications:  Anti-inflammatory, high blood pressure medication, pain medication and steroids.    Current occupation is Retired.   Primary job tasks include:  Lifting/carrying.                                    Objective:  System    Physical Exam    General     ROS    Assessment/Plan:

## 2021-12-21 ENCOUNTER — THERAPY VISIT (OUTPATIENT)
Dept: PHYSICAL THERAPY | Facility: CLINIC | Age: 76
End: 2021-12-21
Payer: COMMERCIAL

## 2021-12-21 DIAGNOSIS — M54.2 NECK PAIN: ICD-10-CM

## 2021-12-21 PROCEDURE — 97110 THERAPEUTIC EXERCISES: CPT | Mod: GP | Performed by: PHYSICAL THERAPIST

## 2021-12-21 PROCEDURE — 97140 MANUAL THERAPY 1/> REGIONS: CPT | Mod: GP | Performed by: PHYSICAL THERAPIST

## 2021-12-27 ENCOUNTER — THERAPY VISIT (OUTPATIENT)
Dept: PHYSICAL THERAPY | Facility: CLINIC | Age: 76
End: 2021-12-27
Payer: COMMERCIAL

## 2021-12-27 DIAGNOSIS — M54.2 NECK PAIN: ICD-10-CM

## 2021-12-27 PROCEDURE — 97140 MANUAL THERAPY 1/> REGIONS: CPT | Mod: GP | Performed by: PHYSICAL THERAPIST

## 2021-12-27 PROCEDURE — 97110 THERAPEUTIC EXERCISES: CPT | Mod: GP | Performed by: PHYSICAL THERAPIST

## 2022-01-04 ENCOUNTER — THERAPY VISIT (OUTPATIENT)
Dept: PHYSICAL THERAPY | Facility: CLINIC | Age: 77
End: 2022-01-04
Payer: COMMERCIAL

## 2022-01-04 DIAGNOSIS — M54.2 NECK PAIN: ICD-10-CM

## 2022-01-04 PROCEDURE — 97140 MANUAL THERAPY 1/> REGIONS: CPT | Mod: GP | Performed by: PHYSICAL THERAPIST

## 2022-01-04 PROCEDURE — 97010 HOT OR COLD PACKS THERAPY: CPT | Mod: GP | Performed by: PHYSICAL THERAPIST

## 2022-01-04 PROCEDURE — 97014 ELECTRIC STIMULATION THERAPY: CPT | Mod: GP | Performed by: PHYSICAL THERAPIST

## 2022-01-10 ENCOUNTER — THERAPY VISIT (OUTPATIENT)
Dept: PHYSICAL THERAPY | Facility: CLINIC | Age: 77
End: 2022-01-10
Payer: COMMERCIAL

## 2022-01-10 DIAGNOSIS — M54.2 NECK PAIN: ICD-10-CM

## 2022-01-10 PROCEDURE — 97140 MANUAL THERAPY 1/> REGIONS: CPT | Mod: GP | Performed by: PHYSICAL THERAPIST

## 2022-01-10 PROCEDURE — 97035 APP MDLTY 1+ULTRASOUND EA 15: CPT | Mod: GP | Performed by: PHYSICAL THERAPIST

## 2022-01-17 ENCOUNTER — THERAPY VISIT (OUTPATIENT)
Dept: PHYSICAL THERAPY | Facility: CLINIC | Age: 77
End: 2022-01-17
Payer: COMMERCIAL

## 2022-01-17 DIAGNOSIS — M54.2 NECK PAIN: ICD-10-CM

## 2022-01-17 PROCEDURE — 97035 APP MDLTY 1+ULTRASOUND EA 15: CPT | Mod: GP | Performed by: PHYSICAL THERAPIST

## 2022-01-17 PROCEDURE — 97110 THERAPEUTIC EXERCISES: CPT | Mod: GP | Performed by: PHYSICAL THERAPIST

## 2022-01-17 PROCEDURE — 97140 MANUAL THERAPY 1/> REGIONS: CPT | Mod: GP | Performed by: PHYSICAL THERAPIST

## 2022-01-17 NOTE — LETTER
[address]  [facility] [address]  [phone]    2022    Re: Alexus Lynch   :   1945  MRN:  [id number]   REFERRING PHYSICIAN:   Noah Perez    [facility]    Date of Initial Evaluation:  21  Visits:  Rxs Used: 6  Reason for Referral:  Neck pain    DISCHARGE REPORT  Progress reporting period is from 2021 to 2022.   Alexus has been seen in PT 6 times for treatment of chronic neck pain.  Treatment has included gentle manual therapy, HEP, US and trial of Estim.    SUBJECTIVE  Subjective: started to feel better on 2022, after 2.5. weeks of feeling pretty awful.  Saw neurosurgeon who plans epidural, and possible consideration of surgery.  Will stay on gabapentin and will try increasing.     Current Pain level: 2/10.     Initial Pain level: 8/10.   Changes in function:  Yes (See Goal flowsheet attached for changes in current functional level)  Adverse reaction to treatment or activity: None    OBJECTIVE  Changes noted in objective findings:    Objective: Cervical ROM continues limited in SB and rot B and ext but less painful.  Increased muscle holding R > L UT today.     ASSESSMENT/PLAN  STG/LTGs have been met or progress has been made towards goals:  Yes (See Goal flow sheet completed today.)  Assessment of Progress: The patient's condition is improving.  Self Management Plans:  Patient is independent in a home treatment program.    Alexus continues to require the following intervention to meet STG and LTG's:  PT intervention is no longer required to meet STG/LTG.                  Re: Alexus Lynch   :   1945    Recommendations:  This patient is ready to be discharged from therapy and continue their home treatment program.    Thank you for your referral.    INQUIRIES  Therapist: Madelin Mariano, PT, OCS   [place] [address]  [facility] [address]  [place] [address]  Phone: [phone]  Fax: [phone]

## 2022-01-18 NOTE — PROGRESS NOTES
DISCHARGE REPORT    Progress reporting period is from 12/14/2021 to 1/17/2022.   Alexus has been seen in PT 6 times for treatment of chronic neck pain.  Treatment has included gentle manual therapy, HEP, US and trial of Estim.    SUBJECTIVE    Subjective: started to feel better on 1/12/2022, after 2.5. weeks of feeling pretty awful.  Saw neurosurgeon who plans epidural, and possible consideration of surgery.  Will stay on gabapentin and will try increasing.       Current Pain level: 2/10.      Initial Pain level: 8/10.   Changes in function:  Yes (See Goal flowsheet attached for changes in current functional level)  Adverse reaction to treatment or activity: None    OBJECTIVE  Changes noted in objective findings:    Objective: Cervical ROM continues limited in SB and rot B and ext but less painful.  Increased muscle holding R > L UT today.     ASSESSMENT/PLAN    STG/LTGs have been met or progress has been made towards goals:  Yes (See Goal flow sheet completed today.)  Assessment of Progress: The patient's condition is improving.  Self Management Plans:  Patient is independent in a home treatment program.    Alexus continues to require the following intervention to meet STG and LTG's:  PT intervention is no longer required to meet STG/LTG.    Recommendations:  This patient is ready to be discharged from therapy and continue their home treatment program.    Please refer to the daily flowsheet for treatment today, total treatment time and time spent performing 1:1 timed codes.

## 2023-10-26 ENCOUNTER — HOSPITAL ENCOUNTER (EMERGENCY)
Facility: CLINIC | Age: 78
Discharge: HOME | End: 2023-10-26
Attending: EMERGENCY MEDICINE | Admitting: EMERGENCY MEDICINE
Payer: COMMERCIAL

## 2023-10-26 ENCOUNTER — APPOINTMENT (OUTPATIENT)
Dept: CT IMAGING | Facility: CLINIC | Age: 78
End: 2023-10-26
Attending: EMERGENCY MEDICINE
Payer: COMMERCIAL

## 2023-10-26 VITALS
DIASTOLIC BLOOD PRESSURE: 69 MMHG | HEART RATE: 80 BPM | WEIGHT: 128 LBS | OXYGEN SATURATION: 95 % | BODY MASS INDEX: 24.19 KG/M2 | SYSTOLIC BLOOD PRESSURE: 125 MMHG | RESPIRATION RATE: 20 BRPM | TEMPERATURE: 98.4 F

## 2023-10-26 DIAGNOSIS — N17.9 AKI (ACUTE KIDNEY INJURY) (H): ICD-10-CM

## 2023-10-26 DIAGNOSIS — R10.9 ABDOMINAL PAIN, UNSPECIFIED ABDOMINAL LOCATION: ICD-10-CM

## 2023-10-26 DIAGNOSIS — K52.9 NON-SPECIFIC COLITIS: ICD-10-CM

## 2023-10-26 LAB
ALBUMIN SERPL BCG-MCNC: 3.7 G/DL (ref 3.5–5.2)
ALBUMIN UR-MCNC: NEGATIVE MG/DL
ALP SERPL-CCNC: 62 U/L (ref 35–104)
ALT SERPL W P-5'-P-CCNC: 27 U/L (ref 0–50)
ANION GAP SERPL CALCULATED.3IONS-SCNC: 12 MMOL/L (ref 7–15)
APPEARANCE UR: CLEAR
AST SERPL W P-5'-P-CCNC: 38 U/L (ref 0–45)
BASOPHILS # BLD AUTO: 0.1 10E3/UL (ref 0–0.2)
BASOPHILS NFR BLD AUTO: 0 %
BILIRUB SERPL-MCNC: 0.4 MG/DL
BILIRUB UR QL STRIP: NEGATIVE
BUN SERPL-MCNC: 25 MG/DL (ref 8–23)
CALCIUM SERPL-MCNC: 9.5 MG/DL (ref 8.8–10.2)
CHLORIDE SERPL-SCNC: 101 MMOL/L (ref 98–107)
COLOR UR AUTO: NORMAL
CREAT SERPL-MCNC: 1.34 MG/DL (ref 0.51–0.95)
DEPRECATED HCO3 PLAS-SCNC: 26 MMOL/L (ref 22–29)
EGFRCR SERPLBLD CKD-EPI 2021: 41 ML/MIN/1.73M2
EOSINOPHIL # BLD AUTO: 0.2 10E3/UL (ref 0–0.7)
EOSINOPHIL NFR BLD AUTO: 1 %
ERYTHROCYTE [DISTWIDTH] IN BLOOD BY AUTOMATED COUNT: 12.5 % (ref 10–15)
GLUCOSE SERPL-MCNC: 121 MG/DL (ref 70–99)
GLUCOSE UR STRIP-MCNC: NEGATIVE MG/DL
HCT VFR BLD AUTO: 40 % (ref 35–47)
HGB BLD-MCNC: 13 G/DL (ref 11.7–15.7)
HGB UR QL STRIP: NEGATIVE
IMM GRANULOCYTES # BLD: 0.1 10E3/UL
IMM GRANULOCYTES NFR BLD: 1 %
KETONES UR STRIP-MCNC: NEGATIVE MG/DL
LEUKOCYTE ESTERASE UR QL STRIP: NEGATIVE
LIPASE SERPL-CCNC: 40 U/L (ref 13–60)
LYMPHOCYTES # BLD AUTO: 2.1 10E3/UL (ref 0.8–5.3)
LYMPHOCYTES NFR BLD AUTO: 11 %
MCH RBC QN AUTO: 30.9 PG (ref 26.5–33)
MCHC RBC AUTO-ENTMCNC: 32.5 G/DL (ref 31.5–36.5)
MCV RBC AUTO: 95 FL (ref 78–100)
MONOCYTES # BLD AUTO: 1.2 10E3/UL (ref 0–1.3)
MONOCYTES NFR BLD AUTO: 6 %
NEUTROPHILS # BLD AUTO: 15.1 10E3/UL (ref 1.6–8.3)
NEUTROPHILS NFR BLD AUTO: 81 %
NITRATE UR QL: NEGATIVE
NRBC # BLD AUTO: 0 10E3/UL
NRBC BLD AUTO-RTO: 0 /100
PH UR STRIP: 7 [PH] (ref 5–7)
PLATELET # BLD AUTO: 284 10E3/UL (ref 150–450)
POTASSIUM SERPL-SCNC: 3.2 MMOL/L (ref 3.4–5.3)
PROT SERPL-MCNC: 6.6 G/DL (ref 6.4–8.3)
RBC # BLD AUTO: 4.21 10E6/UL (ref 3.8–5.2)
RBC URINE: <1 /HPF
SODIUM SERPL-SCNC: 139 MMOL/L (ref 135–145)
SP GR UR STRIP: 1.02 (ref 1–1.03)
UROBILINOGEN UR STRIP-MCNC: NORMAL MG/DL
WBC # BLD AUTO: 18.8 10E3/UL (ref 4–11)
WBC URINE: <1 /HPF

## 2023-10-26 PROCEDURE — 80053 COMPREHEN METABOLIC PANEL: CPT | Performed by: EMERGENCY MEDICINE

## 2023-10-26 PROCEDURE — 85004 AUTOMATED DIFF WBC COUNT: CPT | Performed by: STUDENT IN AN ORGANIZED HEALTH CARE EDUCATION/TRAINING PROGRAM

## 2023-10-26 PROCEDURE — 99285 EMERGENCY DEPT VISIT HI MDM: CPT | Mod: 25

## 2023-10-26 PROCEDURE — 74177 CT ABD & PELVIS W/CONTRAST: CPT

## 2023-10-26 PROCEDURE — 250N000011 HC RX IP 250 OP 636: Performed by: EMERGENCY MEDICINE

## 2023-10-26 PROCEDURE — 250N000009 HC RX 250: Performed by: EMERGENCY MEDICINE

## 2023-10-26 PROCEDURE — 85025 COMPLETE CBC W/AUTO DIFF WBC: CPT | Performed by: EMERGENCY MEDICINE

## 2023-10-26 PROCEDURE — 81001 URINALYSIS AUTO W/SCOPE: CPT | Performed by: EMERGENCY MEDICINE

## 2023-10-26 PROCEDURE — 83690 ASSAY OF LIPASE: CPT | Performed by: EMERGENCY MEDICINE

## 2023-10-26 PROCEDURE — 96360 HYDRATION IV INFUSION INIT: CPT | Mod: 59

## 2023-10-26 PROCEDURE — 83690 ASSAY OF LIPASE: CPT | Performed by: STUDENT IN AN ORGANIZED HEALTH CARE EDUCATION/TRAINING PROGRAM

## 2023-10-26 PROCEDURE — 258N000003 HC RX IP 258 OP 636: Performed by: EMERGENCY MEDICINE

## 2023-10-26 PROCEDURE — 36415 COLL VENOUS BLD VENIPUNCTURE: CPT | Performed by: STUDENT IN AN ORGANIZED HEALTH CARE EDUCATION/TRAINING PROGRAM

## 2023-10-26 PROCEDURE — 80053 COMPREHEN METABOLIC PANEL: CPT | Performed by: STUDENT IN AN ORGANIZED HEALTH CARE EDUCATION/TRAINING PROGRAM

## 2023-10-26 RX ORDER — IOPAMIDOL 755 MG/ML
64 INJECTION, SOLUTION INTRAVASCULAR ONCE
Status: COMPLETED | OUTPATIENT
Start: 2023-10-26 | End: 2023-10-26

## 2023-10-26 RX ADMIN — IOPAMIDOL 64 ML: 755 INJECTION, SOLUTION INTRAVENOUS at 14:44

## 2023-10-26 RX ADMIN — SODIUM CHLORIDE 1000 ML: 9 INJECTION, SOLUTION INTRAVENOUS at 14:26

## 2023-10-26 RX ADMIN — SODIUM CHLORIDE 60 ML: 9 INJECTION, SOLUTION INTRAVENOUS at 14:44

## 2023-10-26 ASSESSMENT — ACTIVITIES OF DAILY LIVING (ADL): ADLS_ACUITY_SCORE: 33

## 2023-10-26 NOTE — ED NOTES
PIT/Triage Evaluation    Patient presented with abdominal pain. The patient reports onset of lower abdominal cramping and pain mostly on her left side two days ago. States that she felt constipated and after using an enema and stool softeners, she had a lot of diarrhea. Notes that she has cramping like she needs to have a bowel movement, but does not pass much stool. Adds that yesterday she had a fever of 100.5 F and then she took some Tylenol. Notes that she experienced the left sided cramping again this morning after eating a sandwich. Endorses history of diverticulosis. Reports she takes prednisone for polymyalgia rheumatica and arthritis. Denies use of diuretic and history of heart failure. Denies leg swelling, dysuria, urinary frequency, and urgency.     Exam is notable for:  General:  Alert, interactive  Cardiovascular:  Well perfused  Lungs:  No respiratory distress, no accessory muscle use  Neuro:  Moving all 4 extremities  Skin:  Warm, dry  Psych:  Normal affect    Appropriate interventions for symptom management were initiated if applicable.  Appropriate diagnostic tests were initiated if indicated.    Important information for subsequent clinician:  Mild left lower quadrant tenderness on exam.  Initially noted to be hypotensive though normotensive on my evaluation, provided IV fluids; likely element of dehydration.  CT abdomen pelvis ordered.  Labs notable for significant elevated white count (patient is on prednisone), and acute kidney injury.    I briefly evaluated the patient and developed an initial plan of care. I discussed this plan and explained that this brief interaction does not constitute a full evaluation. Patient/family understands that they should wait to be fully evaluated and discuss any test results with another clinician prior to leaving the hospital.       Aba Garza, DO  10/26/23 1568

## 2023-10-27 NOTE — ED PROVIDER NOTES
History     Chief Complaint:  Abdominal Pain     HPI   Alexus Lynch is a 78 year old female with history of hypertension, polymyalgia rheumatica, IBS, anemia, and renal insufficiency who presents for evaluation of abdominal pain. The patient reports onset of lower abdominal cramping and pain mostly on her left side two days ago. States that she felt constipated and after using an enema and stool softeners, she had a lot of diarrhea. Notes that she has cramping like she needs to have a bowel movement, but does not pass much stool. Adds that yesterday she had a fever of 100.5 F and then she took some Tylenol. Notes that she experienced the left sided cramping again this morning after eating a sandwich. Endorses history of diverticulosis. Reports she takes prednisone for polymyalgia rheumatica and arthritis. Denies recent antibiotic use. Denies use of diuretic and history of heart failure. Denies leg swelling, dysuria, urinary frequency, and urgency.      Independent Historian:   None - Patient Only    Review of External Notes:   None      Medications:    Atorvastatin  Fexofenadine  Fluticasone  Hyzaar  Omeprazole  Florastor    Past Medical History:    Anemia  Asthma  GERD  Hyperlipidemia  Hypertension  Osteopenia  Polymyalgia rheumatica  Mild aortic regurgitation  Renal insufficiency  Pseudogout  UTI  IBS  Benign neoplasm of colon  Anemia  Inguinal hernia     Past Surgical History:   Knee arthroplasty, bilateral  Inguinal hernia repair, right  Cataract removal, bilateral      Physical Exam   Patient Vitals for the past 24 hrs:   BP Temp Temp src Pulse Resp SpO2 Weight   10/26/23 1915 125/69 -- -- 80 -- 95 % --   10/26/23 1423 112/69 98.4  F (36.9  C) -- 85 -- -- --   10/26/23 1233 -- -- -- -- -- 96 % --   10/26/23 1232 (!) 86/56 98.2  F (36.8  C) Temporal 111 20 -- 58.1 kg (128 lb)     Physical Exam  General: Alert and cooperative with exam. Patient in mild distress. Normal mentation.  Head:  Scalp is  NC/AT  Eyes:  No scleral icterus, PERRL  ENT:  The external nose and ears are normal. The oropharynx is normal and without erythema; mucus membranes are moist.   Neck:  Normal range of motion without rigidity.  CV:  Regular rate and rhythm    No pathologic murmur   Resp:  Breath sounds are clear bilaterally    Non-labored, no retractions or accessory muscle use  GI:  Abdomen is soft, no distension, minimal lower abdominal tenderness to palpation. No peritoneal signs  MS:  No lower extremity edema   Skin:  Warm and dry, No rash or lesions noted.  Neuro: Oriented x 3. No gross motor deficits.    Emergency Department Course   Imaging:  CT Abdomen Pelvis w Contrast   Final Result   IMPRESSION:    1.  Moderate diffuse bowel wall thickening in the descending and   distal transverse colon, consistent with a nonspecific colitis, most   likely infectious or inflammatory.   2.  Sigmoid diverticulosis, without evidence for diverticulitis.      BRITTANI SALAS MD            SYSTEM ID:  [id number]         Laboratory:  Labs Ordered and Resulted from Time of ED Arrival to Time of ED Departure   COMPREHENSIVE METABOLIC PANEL - Abnormal       Result Value    Sodium 139      Potassium 3.2 (*)     Carbon Dioxide (CO2) 26      Anion Gap 12      Urea Nitrogen 25.0 (*)     Creatinine 1.34 (*)     GFR Estimate 41 (*)     Calcium 9.5      Chloride 101      Glucose 121 (*)     Alkaline Phosphatase 62      AST 38      ALT 27      Protein Total 6.6      Albumin 3.7      Bilirubin Total 0.4     CBC WITH PLATELETS AND DIFFERENTIAL - Abnormal    WBC Count 18.8 (*)     RBC Count 4.21      Hemoglobin 13.0      Hematocrit 40.0      MCV 95      MCH 30.9      MCHC 32.5      RDW 12.5      Platelet Count 284      % Neutrophils 81      % Lymphocytes 11      % Monocytes 6      % Eosinophils 1      % Basophils 0      % Immature Granulocytes 1      NRBCs per 100 WBC 0      Absolute Neutrophils 15.1 (*)     Absolute Lymphocytes 2.1      Absolute Monocytes  1.2      Absolute Eosinophils 0.2      Absolute Basophils 0.1      Absolute Immature Granulocytes 0.1      Absolute NRBCs 0.0     LIPASE - Normal    Lipase 40     ROUTINE UA WITH MICROSCOPIC REFLEX TO CULTURE - Normal    Color Urine Straw      Appearance Urine Clear      Glucose Urine Negative      Bilirubin Urine Negative      Ketones Urine Negative      Specific Gravity Urine 1.017      Blood Urine Negative      pH Urine 7.0      Protein Albumin Urine Negative      Urobilinogen Urine Normal      Nitrite Urine Negative      Leukocyte Esterase Urine Negative      RBC Urine <1      WBC Urine <1        Emergency Department Course & Assessments:     Interventions:  Medications   sodium chloride 0.9% BOLUS 1,000 mL (0 mLs Intravenous Stopped 10/26/23 1526)   iopamidol (ISOVUE-370) solution 64 mL (64 mLs Intravenous $Given 10/26/23 1444)   sodium chloride 0.9 % bag 500mL for CT scan flush use (60 mLs Intravenous $Given 10/26/23 1444)      Assessments:  1425 I obtained history and examined the patient as noted above.  1918 I rechecked and updated the patient.   1942 I rechecked and updated the patient after speaking with Dr. Nicholson.     Independent Interpretation (X-rays, CTs, rhythm strip):  None    Consultations/Discussion of Management or Tests:  1937 I spoke with Dr. Nicholson from [facility] regarding outpatient follow up.        Social Determinants of Health affecting care:   None    Disposition:  The patient was discharged to home.     Impression & Plan    Medical Decision Making:  Alexus Lynch is a 78 year old female who presents with lower abdominal abdominal pain, low-grade fever yesterday, and recent diarrhea (resolving). I considered a broad differential including  diverticulitis, colitis, appendicitis, functional bowel disease, constipation, UTI, GIB, pyelonephritis, ureterolithiasis, hernia, etc.  Rare and serious causes were considered as well in this patient such as volvulus, abscess, aneurysmal disease,  mesenteric ischemia, etc.    The workup in the ED is consistent with colitis as noted on CT above.  The differential of this includes ischemic, bacterial, idiopathic, inflammatory, autoimmune, etc.  Stool studies were not obtained because the patient is no longer having diarrhea. The patient looks well and this point with a reassuring exam.  Labs notable for mild acute kidney injury (likely prerenal) and elevated white count (18.8; likely combination of inflammatory process, dehydration, current prednisone use).  Patient received IV fluids in the ED and pain is well controlled.  No indication for additional steroids or antibiotics at this time.  Did discuss with [facility] to arrange for close follow-up in clinic.  Return precautions discussed.  Patient discharged home.  She is in agreement with plan.    Diagnosis:    ICD-10-CM    1. Non-specific colitis  K52.9       2. Abdominal pain, unspecified abdominal location  R10.9       3. EDISON (acute kidney injury) (H24)  N17.9            Scribe Disclosure:  I, Kaci Richards, am serving as a scribe at 7:37 PM on 10/26/2023 to document services personally performed by Aba Garza DO based on my observations and the provider's statements to me.     10/26/2023   Aba Garza DO O'Neill, Christopher Warren, DO  10/27/23 1033

## 2024-11-07 ENCOUNTER — MEDICAL CORRESPONDENCE (OUTPATIENT)
Dept: HEALTH INFORMATION MANAGEMENT | Facility: CLINIC | Age: 79
End: 2024-11-07

## 2024-12-04 ENCOUNTER — TRANSCRIBE ORDERS (OUTPATIENT)
Dept: PHARMACY | Facility: CLINIC | Age: 79
End: 2024-12-04
Payer: COMMERCIAL

## 2024-12-04 DIAGNOSIS — M81.0 SENILE OSTEOPOROSIS: Primary | ICD-10-CM

## 2024-12-04 RX ORDER — HEPARIN SODIUM,PORCINE 10 UNIT/ML
5-20 VIAL (ML) INTRAVENOUS DAILY PRN
OUTPATIENT
Start: 2024-12-09

## 2024-12-04 RX ORDER — HEPARIN SODIUM (PORCINE) LOCK FLUSH IV SOLN 100 UNIT/ML 100 UNIT/ML
5 SOLUTION INTRAVENOUS
OUTPATIENT
Start: 2024-12-09

## 2024-12-04 RX ORDER — METHYLPREDNISOLONE SODIUM SUCCINATE 40 MG/ML
40 INJECTION INTRAMUSCULAR; INTRAVENOUS
Start: 2024-12-09

## 2024-12-04 RX ORDER — EPINEPHRINE 1 MG/ML
0.3 INJECTION, SOLUTION INTRAMUSCULAR; SUBCUTANEOUS EVERY 5 MIN PRN
OUTPATIENT
Start: 2024-12-09

## 2024-12-04 RX ORDER — ZOLEDRONIC ACID 0.05 MG/ML
5 INJECTION, SOLUTION INTRAVENOUS ONCE
Start: 2024-12-09

## 2024-12-04 RX ORDER — DIPHENHYDRAMINE HYDROCHLORIDE 50 MG/ML
25 INJECTION INTRAMUSCULAR; INTRAVENOUS
Start: 2024-12-09

## 2024-12-04 RX ORDER — ALBUTEROL SULFATE 90 UG/1
1-2 INHALANT RESPIRATORY (INHALATION)
Start: 2024-12-09

## 2024-12-04 RX ORDER — ALBUTEROL SULFATE 0.83 MG/ML
2.5 SOLUTION RESPIRATORY (INHALATION)
OUTPATIENT
Start: 2024-12-09

## 2024-12-04 RX ORDER — MEPERIDINE HYDROCHLORIDE 25 MG/ML
25 INJECTION INTRAMUSCULAR; INTRAVENOUS; SUBCUTANEOUS
OUTPATIENT
Start: 2024-12-09

## 2024-12-04 RX ORDER — DIPHENHYDRAMINE HYDROCHLORIDE 50 MG/ML
50 INJECTION INTRAMUSCULAR; INTRAVENOUS
Start: 2024-12-09

## 2024-12-30 ENCOUNTER — INFUSION THERAPY VISIT (OUTPATIENT)
Dept: INFUSION THERAPY | Facility: CLINIC | Age: 79
End: 2024-12-30
Attending: FAMILY MEDICINE
Payer: COMMERCIAL

## 2024-12-30 VITALS
BODY MASS INDEX: 23.39 KG/M2 | OXYGEN SATURATION: 99 % | HEIGHT: 59 IN | RESPIRATION RATE: 18 BRPM | TEMPERATURE: 98.7 F | DIASTOLIC BLOOD PRESSURE: 76 MMHG | WEIGHT: 116 LBS | HEART RATE: 90 BPM | SYSTOLIC BLOOD PRESSURE: 128 MMHG

## 2024-12-30 DIAGNOSIS — M81.0 SENILE OSTEOPOROSIS: Primary | ICD-10-CM

## 2024-12-30 PROCEDURE — 96365 THER/PROPH/DIAG IV INF INIT: CPT

## 2024-12-30 PROCEDURE — 250N000011 HC RX IP 250 OP 636: Performed by: FAMILY MEDICINE

## 2024-12-30 RX ORDER — DIPHENHYDRAMINE HYDROCHLORIDE 50 MG/ML
25 INJECTION INTRAMUSCULAR; INTRAVENOUS
Start: 2025-12-30

## 2024-12-30 RX ORDER — ZOLEDRONIC ACID 0.05 MG/ML
5 INJECTION, SOLUTION INTRAVENOUS ONCE
Status: COMPLETED | OUTPATIENT
Start: 2024-12-30 | End: 2024-12-30

## 2024-12-30 RX ORDER — EPINEPHRINE 1 MG/ML
0.3 INJECTION, SOLUTION INTRAMUSCULAR; SUBCUTANEOUS EVERY 5 MIN PRN
OUTPATIENT
Start: 2025-12-30

## 2024-12-30 RX ORDER — ZOLEDRONIC ACID 0.05 MG/ML
5 INJECTION, SOLUTION INTRAVENOUS ONCE
Start: 2025-12-30

## 2024-12-30 RX ORDER — METHOTREXATE SODIUM 2.5 MG/1
15 TABLET ORAL WEEKLY
COMMUNITY

## 2024-12-30 RX ORDER — MEPERIDINE HYDROCHLORIDE 25 MG/ML
25 INJECTION INTRAMUSCULAR; INTRAVENOUS; SUBCUTANEOUS
OUTPATIENT
Start: 2025-12-30

## 2024-12-30 RX ORDER — HEPARIN SODIUM,PORCINE 10 UNIT/ML
5-20 VIAL (ML) INTRAVENOUS DAILY PRN
OUTPATIENT
Start: 2025-12-30

## 2024-12-30 RX ORDER — METHYLPREDNISOLONE SODIUM SUCCINATE 40 MG/ML
40 INJECTION INTRAMUSCULAR; INTRAVENOUS
Start: 2025-12-30

## 2024-12-30 RX ORDER — ALBUTEROL SULFATE 90 UG/1
1-2 INHALANT RESPIRATORY (INHALATION)
Start: 2025-12-30

## 2024-12-30 RX ORDER — BECLOMETHASONE DIPROPIONATE HFA 40 UG/1
1 AEROSOL, METERED RESPIRATORY (INHALATION) 2 TIMES DAILY
COMMUNITY

## 2024-12-30 RX ORDER — HEPARIN SODIUM (PORCINE) LOCK FLUSH IV SOLN 100 UNIT/ML 100 UNIT/ML
5 SOLUTION INTRAVENOUS
OUTPATIENT
Start: 2025-12-30

## 2024-12-30 RX ORDER — ALBUTEROL SULFATE 0.83 MG/ML
2.5 SOLUTION RESPIRATORY (INHALATION)
OUTPATIENT
Start: 2025-12-30

## 2024-12-30 RX ORDER — DIPHENHYDRAMINE HYDROCHLORIDE 50 MG/ML
50 INJECTION INTRAMUSCULAR; INTRAVENOUS
Start: 2025-12-30

## 2024-12-30 RX ADMIN — ZOLEDRONIC ACID 5 MG: 0.05 INJECTION, SOLUTION INTRAVENOUS at 15:27

## 2024-12-30 NOTE — PROGRESS NOTES
Infusion Nursing Note:  Alexus Lynch presents today for Reclast.    Patient seen by provider today: No   present during visit today: Not Applicable.    Note: Pt's first time getting Reclast. Educated pt on drug and side effects. Gave pt a drug info hand out. Pt was told by her provider to stop taking Calcium/Vit D supplements d/t elevated calcium. Discussed with pt to talk to her provider about resuming now that she has start Reclast.      Intravenous Access:  Peripheral IV placed.    Treatment Conditions:  From [facility] 10/30/24- Ca 10.5, Creat 0.97.      Post Infusion Assessment:  Patient tolerated infusion without incident.  Blood return noted pre and post infusion.  Site patent and intact, free from redness, edema or discomfort.  No evidence of extravasations.  Access discontinued per protocol.       Discharge Plan:   Discharge instructions reviewed with: Patient.  Patient and/or family verbalized understanding of discharge instructions and all questions answered.  AVS to patient via [employer].  Patient will return next year for next appointment.   Patient discharged in stable condition accompanied by: .  Departure Mode: Ambulatory with walking stick.      Beth Berrios RN

## 2025-06-15 ENCOUNTER — APPOINTMENT (OUTPATIENT)
Dept: GENERAL RADIOLOGY | Facility: CLINIC | Age: 80
End: 2025-06-15
Attending: EMERGENCY MEDICINE
Payer: COMMERCIAL

## 2025-06-15 ENCOUNTER — HOSPITAL ENCOUNTER (INPATIENT)
Facility: CLINIC | Age: 80
LOS: 1 days | Discharge: HOME | End: 2025-06-17
Attending: EMERGENCY MEDICINE | Admitting: HOSPITALIST
Payer: COMMERCIAL

## 2025-06-15 DIAGNOSIS — M25.552 HIP PAIN, LEFT: ICD-10-CM

## 2025-06-15 PROCEDURE — 250N000013 HC RX MED GY IP 250 OP 250 PS 637: Performed by: EMERGENCY MEDICINE

## 2025-06-15 PROCEDURE — 99285 EMERGENCY DEPT VISIT HI MDM: CPT | Mod: 25

## 2025-06-15 PROCEDURE — 73502 X-RAY EXAM HIP UNI 2-3 VIEWS: CPT

## 2025-06-15 RX ORDER — ACETAMINOPHEN 325 MG/1
650 TABLET ORAL ONCE
Status: COMPLETED | OUTPATIENT
Start: 2025-06-15 | End: 2025-06-15

## 2025-06-15 RX ORDER — CYCLOBENZAPRINE HCL 10 MG
10 TABLET ORAL ONCE
Status: COMPLETED | OUTPATIENT
Start: 2025-06-15 | End: 2025-06-15

## 2025-06-15 RX ADMIN — ACETAMINOPHEN 650 MG: 325 TABLET, FILM COATED ORAL at 22:51

## 2025-06-15 RX ADMIN — CYCLOBENZAPRINE 10 MG: 10 TABLET, FILM COATED ORAL at 23:24

## 2025-06-15 ASSESSMENT — COLUMBIA-SUICIDE SEVERITY RATING SCALE - C-SSRS
6. HAVE YOU EVER DONE ANYTHING, STARTED TO DO ANYTHING, OR PREPARED TO DO ANYTHING TO END YOUR LIFE?: NO
1. IN THE PAST MONTH, HAVE YOU WISHED YOU WERE DEAD OR WISHED YOU COULD GO TO SLEEP AND NOT WAKE UP?: NO
2. HAVE YOU ACTUALLY HAD ANY THOUGHTS OF KILLING YOURSELF IN THE PAST MONTH?: NO

## 2025-06-15 ASSESSMENT — ACTIVITIES OF DAILY LIVING (ADL)
ADLS_ACUITY_SCORE: 41
ADLS_ACUITY_SCORE: 41

## 2025-06-16 ENCOUNTER — APPOINTMENT (OUTPATIENT)
Dept: MRI IMAGING | Facility: CLINIC | Age: 80
End: 2025-06-16
Attending: HOSPITALIST
Payer: COMMERCIAL

## 2025-06-16 PROBLEM — M25.552 HIP PAIN, LEFT: Status: ACTIVE | Noted: 2025-06-16

## 2025-06-16 LAB
ANION GAP SERPL CALCULATED.3IONS-SCNC: 10 MMOL/L (ref 7–15)
ANION GAP SERPL CALCULATED.3IONS-SCNC: 9 MMOL/L (ref 7–15)
BASOPHILS # BLD AUTO: 0.1 10E3/UL (ref 0–0.2)
BASOPHILS NFR BLD AUTO: 1 %
BUN SERPL-MCNC: 25.9 MG/DL (ref 8–23)
BUN SERPL-MCNC: 31 MG/DL (ref 8–23)
CALCIUM SERPL-MCNC: 8.6 MG/DL (ref 8.8–10.4)
CALCIUM SERPL-MCNC: 8.7 MG/DL (ref 8.8–10.4)
CHLORIDE SERPL-SCNC: 107 MMOL/L (ref 98–107)
CHLORIDE SERPL-SCNC: 107 MMOL/L (ref 98–107)
CREAT SERPL-MCNC: 0.97 MG/DL (ref 0.51–0.95)
CREAT SERPL-MCNC: 1.05 MG/DL (ref 0.51–0.95)
EGFRCR SERPLBLD CKD-EPI 2021: 54 ML/MIN/1.73M2
EGFRCR SERPLBLD CKD-EPI 2021: 59 ML/MIN/1.73M2
EOSINOPHIL # BLD AUTO: 0.1 10E3/UL (ref 0–0.7)
EOSINOPHIL NFR BLD AUTO: 1 %
ERYTHROCYTE [DISTWIDTH] IN BLOOD BY AUTOMATED COUNT: 12.3 % (ref 10–15)
ERYTHROCYTE [DISTWIDTH] IN BLOOD BY AUTOMATED COUNT: 12.4 % (ref 10–15)
GLUCOSE SERPL-MCNC: 108 MG/DL (ref 70–99)
GLUCOSE SERPL-MCNC: 93 MG/DL (ref 70–99)
HCO3 SERPL-SCNC: 25 MMOL/L (ref 22–29)
HCO3 SERPL-SCNC: 25 MMOL/L (ref 22–29)
HCT VFR BLD AUTO: 30.1 % (ref 35–47)
HCT VFR BLD AUTO: 32.6 % (ref 35–47)
HGB BLD-MCNC: 10.1 G/DL (ref 11.7–15.7)
HGB BLD-MCNC: 10.4 G/DL (ref 11.7–15.7)
IMM GRANULOCYTES # BLD: 0 10E3/UL
IMM GRANULOCYTES NFR BLD: 0 %
LYMPHOCYTES # BLD AUTO: 0.9 10E3/UL (ref 0.8–5.3)
LYMPHOCYTES NFR BLD AUTO: 9 %
MCH RBC QN AUTO: 30.1 PG (ref 26.5–33)
MCH RBC QN AUTO: 31 PG (ref 26.5–33)
MCHC RBC AUTO-ENTMCNC: 31.9 G/DL (ref 31.5–36.5)
MCHC RBC AUTO-ENTMCNC: 33.6 G/DL (ref 31.5–36.5)
MCV RBC AUTO: 92 FL (ref 78–100)
MCV RBC AUTO: 94 FL (ref 78–100)
MONOCYTES # BLD AUTO: 0.7 10E3/UL (ref 0–1.3)
MONOCYTES NFR BLD AUTO: 7 %
NEUTROPHILS # BLD AUTO: 8.2 10E3/UL (ref 1.6–8.3)
NEUTROPHILS NFR BLD AUTO: 81 %
NRBC # BLD AUTO: 0 10E3/UL
NRBC BLD AUTO-RTO: 0 /100
PLATELET # BLD AUTO: 251 10E3/UL (ref 150–450)
PLATELET # BLD AUTO: 257 10E3/UL (ref 150–450)
POTASSIUM SERPL-SCNC: 3.9 MMOL/L (ref 3.4–5.3)
POTASSIUM SERPL-SCNC: 4.1 MMOL/L (ref 3.4–5.3)
RBC # BLD AUTO: 3.26 10E6/UL (ref 3.8–5.2)
RBC # BLD AUTO: 3.46 10E6/UL (ref 3.8–5.2)
SODIUM SERPL-SCNC: 141 MMOL/L (ref 135–145)
SODIUM SERPL-SCNC: 142 MMOL/L (ref 135–145)
WBC # BLD AUTO: 10 10E3/UL (ref 4–11)
WBC # BLD AUTO: 10.5 10E3/UL (ref 4–11)

## 2025-06-16 PROCEDURE — 250N000013 HC RX MED GY IP 250 OP 250 PS 637: Performed by: HOSPITALIST

## 2025-06-16 PROCEDURE — 99222 1ST HOSP IP/OBS MODERATE 55: CPT | Performed by: HOSPITALIST

## 2025-06-16 PROCEDURE — 85014 HEMATOCRIT: CPT | Performed by: HOSPITALIST

## 2025-06-16 PROCEDURE — 80048 BASIC METABOLIC PNL TOTAL CA: CPT | Performed by: EMERGENCY MEDICINE

## 2025-06-16 PROCEDURE — 250N000012 HC RX MED GY IP 250 OP 636 PS 637: Performed by: HOSPITALIST

## 2025-06-16 PROCEDURE — 36415 COLL VENOUS BLD VENIPUNCTURE: CPT | Performed by: EMERGENCY MEDICINE

## 2025-06-16 PROCEDURE — 85025 COMPLETE CBC W/AUTO DIFF WBC: CPT | Performed by: EMERGENCY MEDICINE

## 2025-06-16 PROCEDURE — 80048 BASIC METABOLIC PNL TOTAL CA: CPT | Performed by: HOSPITALIST

## 2025-06-16 PROCEDURE — 72195 MRI PELVIS W/O DYE: CPT

## 2025-06-16 PROCEDURE — G0378 HOSPITAL OBSERVATION PER HR: HCPCS

## 2025-06-16 PROCEDURE — 120N000001 HC R&B MED SURG/OB

## 2025-06-16 PROCEDURE — 99207 PR NO BILLABLE SERVICE THIS VISIT: CPT | Performed by: HOSPITALIST

## 2025-06-16 PROCEDURE — 36415 COLL VENOUS BLD VENIPUNCTURE: CPT | Performed by: HOSPITALIST

## 2025-06-16 RX ORDER — METHOCARBAMOL 500 MG/1
250 TABLET ORAL 3 TIMES DAILY PRN
Status: DISCONTINUED | OUTPATIENT
Start: 2025-06-16 | End: 2025-06-17 | Stop reason: HOSPADM

## 2025-06-16 RX ORDER — LOSARTAN POTASSIUM 100 MG/1
100 TABLET ORAL EVERY OTHER DAY
Status: DISCONTINUED | OUTPATIENT
Start: 2025-06-16 | End: 2025-06-17 | Stop reason: HOSPADM

## 2025-06-16 RX ORDER — LOSARTAN POTASSIUM 100 MG/1
100 TABLET ORAL EVERY OTHER DAY
COMMUNITY

## 2025-06-16 RX ORDER — PREDNISONE 1 MG/1
2 TABLET ORAL DAILY
COMMUNITY

## 2025-06-16 RX ORDER — ONDANSETRON 2 MG/ML
4 INJECTION INTRAMUSCULAR; INTRAVENOUS EVERY 6 HOURS PRN
Status: DISCONTINUED | OUTPATIENT
Start: 2025-06-16 | End: 2025-06-17 | Stop reason: HOSPADM

## 2025-06-16 RX ORDER — HYDROXYZINE HYDROCHLORIDE 25 MG/1
50 TABLET, FILM COATED ORAL EVERY 6 HOURS PRN
Status: DISCONTINUED | OUTPATIENT
Start: 2025-06-16 | End: 2025-06-17 | Stop reason: HOSPADM

## 2025-06-16 RX ORDER — GABAPENTIN 100 MG/1
200 CAPSULE ORAL 3 TIMES DAILY
COMMUNITY

## 2025-06-16 RX ORDER — AMOXICILLIN 250 MG
2 CAPSULE ORAL 2 TIMES DAILY PRN
Status: DISCONTINUED | OUTPATIENT
Start: 2025-06-16 | End: 2025-06-17 | Stop reason: HOSPADM

## 2025-06-16 RX ORDER — ACETAMINOPHEN 325 MG/1
650 TABLET ORAL EVERY 4 HOURS PRN
Status: DISCONTINUED | OUTPATIENT
Start: 2025-06-16 | End: 2025-06-17 | Stop reason: HOSPADM

## 2025-06-16 RX ORDER — AMOXICILLIN 250 MG
1 CAPSULE ORAL 2 TIMES DAILY PRN
Status: DISCONTINUED | OUTPATIENT
Start: 2025-06-16 | End: 2025-06-17 | Stop reason: HOSPADM

## 2025-06-16 RX ORDER — LEFLUNOMIDE 20 MG/1
20 TABLET ORAL DAILY
Status: DISCONTINUED | OUTPATIENT
Start: 2025-06-16 | End: 2025-06-17 | Stop reason: HOSPADM

## 2025-06-16 RX ORDER — ONDANSETRON 4 MG/1
4 TABLET, ORALLY DISINTEGRATING ORAL EVERY 6 HOURS PRN
Status: DISCONTINUED | OUTPATIENT
Start: 2025-06-16 | End: 2025-06-17 | Stop reason: HOSPADM

## 2025-06-16 RX ORDER — HYDROXYZINE HYDROCHLORIDE 25 MG/1
25 TABLET, FILM COATED ORAL EVERY 6 HOURS PRN
Status: DISCONTINUED | OUTPATIENT
Start: 2025-06-16 | End: 2025-06-17 | Stop reason: HOSPADM

## 2025-06-16 RX ORDER — GABAPENTIN 100 MG/1
200 CAPSULE ORAL 3 TIMES DAILY
Status: DISCONTINUED | OUTPATIENT
Start: 2025-06-16 | End: 2025-06-16

## 2025-06-16 RX ORDER — PREDNISONE 5 MG/1
5 TABLET ORAL DAILY
Status: DISCONTINUED | OUTPATIENT
Start: 2025-06-16 | End: 2025-06-16

## 2025-06-16 RX ORDER — GABAPENTIN 100 MG/1
200 CAPSULE ORAL 3 TIMES DAILY
Status: DISCONTINUED | OUTPATIENT
Start: 2025-06-16 | End: 2025-06-17 | Stop reason: HOSPADM

## 2025-06-16 RX ORDER — ACETAMINOPHEN 650 MG/1
650 SUPPOSITORY RECTAL EVERY 4 HOURS PRN
Status: DISCONTINUED | OUTPATIENT
Start: 2025-06-16 | End: 2025-06-17 | Stop reason: HOSPADM

## 2025-06-16 RX ORDER — PREDNISONE 1 MG/1
2 TABLET ORAL DAILY
Status: DISCONTINUED | OUTPATIENT
Start: 2025-06-16 | End: 2025-06-16

## 2025-06-16 RX ORDER — PREDNISONE 5 MG/1
5 TABLET ORAL DAILY
COMMUNITY

## 2025-06-16 RX ORDER — PROCHLORPERAZINE MALEATE 5 MG/1
5 TABLET ORAL EVERY 6 HOURS PRN
Status: DISCONTINUED | OUTPATIENT
Start: 2025-06-16 | End: 2025-06-17 | Stop reason: HOSPADM

## 2025-06-16 RX ORDER — LEFLUNOMIDE 20 MG/1
20 TABLET ORAL DAILY
COMMUNITY

## 2025-06-16 RX ADMIN — ACETAMINOPHEN 650 MG: 325 TABLET, FILM COATED ORAL at 08:06

## 2025-06-16 RX ADMIN — GABAPENTIN 200 MG: 100 CAPSULE ORAL at 13:41

## 2025-06-16 RX ADMIN — ACETAMINOPHEN 650 MG: 325 TABLET, FILM COATED ORAL at 18:02

## 2025-06-16 RX ADMIN — PREDNISONE 7 MG: 5 TABLET ORAL at 13:41

## 2025-06-16 RX ADMIN — GABAPENTIN 200 MG: 100 CAPSULE ORAL at 20:15

## 2025-06-16 ASSESSMENT — ACTIVITIES OF DAILY LIVING (ADL)
ADLS_ACUITY_SCORE: 41
ADLS_ACUITY_SCORE: 41
ADLS_ACUITY_SCORE: 54
ADLS_ACUITY_SCORE: 41
ADLS_ACUITY_SCORE: 54
ADLS_ACUITY_SCORE: 56
ADLS_ACUITY_SCORE: 54
ADLS_ACUITY_SCORE: 43
ADLS_ACUITY_SCORE: 41
ADLS_ACUITY_SCORE: 54
ADLS_ACUITY_SCORE: 41
ADLS_ACUITY_SCORE: 54
ADLS_ACUITY_SCORE: 41
ADLS_ACUITY_SCORE: 41
ADLS_ACUITY_SCORE: 56
ADLS_ACUITY_SCORE: 41
ADLS_ACUITY_SCORE: 43
ADLS_ACUITY_SCORE: 41
ADLS_ACUITY_SCORE: 54
ADLS_ACUITY_SCORE: 41
ADLS_ACUITY_SCORE: 54
ADLS_ACUITY_SCORE: 55
ADLS_ACUITY_SCORE: 41

## 2025-06-16 NOTE — ED NOTES
[facility]  ED Nurse Handoff Report    ED Chief complaint: Hip Pain      ED Diagnosis:   Final diagnoses:   Hip pain, left       Code Status: Full Code    Allergies:   Allergies   Allergen Reactions    Nuts Itching     cashews & pistachios    Kiwi Other (See Comments)     Throat itches    Oxycodone Nausea and Nausea and Vomiting    Shellfish-Derived Products     Shrimp Other (See Comments)     Throat starts to itch    Sulfur Unknown    Celebrex [Celecoxib] Rash    Sulfa Antibiotics Rash       Patient Story: Pt to ED w/ left hip pain that worsened severely today an hour prior to arrival after having left hip issues since a fall 8 months ago.  Focused Assessment:  Pt A&Ox4. Calm and cooperative. States pain is only severe when moving.    Treatments and/or interventions provided: Labs, imaging, 650mg tylenol, 10mg flexeril.  Patient's response to treatments and/or interventions: Tolerated    To be done/followed up on inpatient unit:  Observation    Does this patient have any cognitive concerns?: n/a    Activity level - Baseline/Home:  Cane  Activity Level - Current:   Stand with assist x2    Patient's Preferred language: English   Needed?: No    Isolation: None  Infection: Not Applicable  Sepsis treatment initiated: No  Patient tested for COVID 19 prior to admission: NO  Bariatric?: No    Vital Signs:   Vitals:    06/15/25 2145 06/15/25 2230 06/15/25 2330 06/16/25 0030   BP: 124/77 137/88 113/79 138/76   Pulse: 101 100 74 74   Resp: 18 18 16 16   Temp: 97.6  F (36.4  C)      TempSrc: Temporal      SpO2: 98% 96% 97% 98%       Cardiac Rhythm:     Was the PSS-3 completed:   Yes  What interventions are required if any?               Family Comments:   OBS brochure/video discussed/provided to patient/family: Yes              Name of person given brochure if not patient:               Relationship to patient:     For the majority of the shift this patient's behavior was Green.   Behavioral  interventions performed were .    ED NURSE PHONE NUMBER: [phone]

## 2025-06-16 NOTE — ED PROVIDER NOTES
Emergency Department Note      History of Present Illness     Chief Complaint   Hip Pain      HPI   Alexus Lynch is a 80 year old female with a history of GERD, asthma, hypertension, EDISON and muscular injury to her left hip presenting to the Emergency Department with her  for evaluation of hip pain. Patient reports worsening pain and swelling in her left hip that came on suddenly after walking up the stairs today. She has also developed a lump on her left gluteal region. Patient is ambulatory with assistance from her cane, but the pain is exacerbated with movement which makes ambulating difficult. Prior to this incident she was ambulating at her baseline, though ambulation has been difficult since a muscle tear injury in her hip following a fall 8 months ago. She also reports she has had intermittent shooting pain in her hip and down her leg with movement for a period of time and believes the current presentation to be an exacerbation of those symptoms. Due to this she scheduled an Orthopedic appointment for tomorrow. Her  called her Orthopedist today who advised she go to the ED. No numbness or tingling down the leg. No recent falls or known trauma injuries. Also reports history of right hamstring injury 6 months ago with no changes. Her last dose of Tylenol was at 1600.    Independent Historian   None    Review of External Notes   None    Past Medical History     Medical History and Problem List   Anemia  Asthma  Gastro-oesophageal reflux disease  Hyperlipidemia  Hypertension  Osteopenia  Rhinitis  PMR  Polyarthropathy  Renal insufficiency  IBS  Neoplasm of colon  EDISON  Hernia    Medications   Lipitor  Beclomethasone  Hyzaar  Methotrexate  Omeprazole  Cozaar  Gabapentin  Albuterol  Prednisone  Hydrochlorothiazide    Surgical History   Arthroplasty, knee, bilateral  Endoscopy  Hernia repair, right  Left foot fusion  Arthroscopy, left knee, meniscus tear  Arthroereisis    Physical Exam     Patient  Vitals for the past 24 hrs:   BP Temp Temp src Pulse Resp SpO2   06/16/25 0130 119/82 -- -- 84 18 96 %   06/16/25 0030 138/76 -- -- 74 16 98 %   06/15/25 2330 113/79 -- -- 74 16 97 %   06/15/25 2230 137/88 -- -- 100 18 96 %   06/15/25 2145 124/77 97.6  F (36.4  C) Temporal 101 18 98 %     Physical Exam  Eyes:  The pupils are equal and round    Conjunctivae and sclerae are normal  ENT:    The nose is normal    Pinnae are normal  CV:  Regular rate and rhythm     No edema    Equal DP pulses bilaterally  Resp:  Lungs are clear    Non-labored    No rales    No wheezing   GI:  Abdomen is soft, there is no rigidity    No distension    No rebound tenderness   MS:  Normal muscular tone    No asymmetric leg swelling  Skin:  No rash or acute skin lesions noted  Neuro:   Awake, alert.      Speech is normal and fluent.    Face is symmetric.     Moves all extremities    SILT in bilateral lower extremities    Diagnostics     Lab Results   Labs Ordered and Resulted from Time of ED Arrival to Time of ED Departure   BASIC METABOLIC PANEL - Abnormal       Result Value    Sodium 142      Potassium 4.1      Chloride 107      Carbon Dioxide (CO2) 25      Anion Gap 10      Urea Nitrogen 31.0 (*)     Creatinine 1.05 (*)     GFR Estimate 54 (*)     Calcium 8.7 (*)     Glucose 108 (*)    CBC WITH PLATELETS AND DIFFERENTIAL - Abnormal    WBC Count 10.0      RBC Count 3.46 (*)     Hemoglobin 10.4 (*)     Hematocrit 32.6 (*)     MCV 94      MCH 30.1      MCHC 31.9      RDW 12.3      Platelet Count 257      % Neutrophils 81      % Lymphocytes 9      % Monocytes 7      % Eosinophils 1      % Basophils 1      % Immature Granulocytes 0      NRBCs per 100 WBC 0      Absolute Neutrophils 8.2      Absolute Lymphocytes 0.9      Absolute Monocytes 0.7      Absolute Eosinophils 0.1      Absolute Basophils 0.1      Absolute Immature Granulocytes 0.0      Absolute NRBCs 0.0         Imaging   XR Pelvis and Hip Left 1 View   Final Result   IMPRESSION:  Degenerative changes lower lumbar spine and joints of the pelvis including the left hip joint. No acute fracture or dislocation. New onset soft tissue swelling in the left gluteal region. If there is continued clinical concern for soft tissue    injury, consider dedicated MR for further assessment, if technically feasible.      MR Pelvis Muscular Tissue wo Contrast    (Results Pending)       EKG     Independent Interpretation   X-ray pelvis and left hip shows fullness of the soft tissues in the left gluteal region.  No fractures    ED Course      Medications Administered   Medications   acetaminophen (TYLENOL) tablet 650 mg (650 mg Oral $Given 6/15/25 2251)   cyclobenzaprine (FLEXERIL) tablet 10 mg (10 mg Oral $Given 6/15/25 2935)       Procedures   Procedures     Discussion of Management   See ED course.    ED Course   ED Course as of 06/16/25 0210   Sun Bryan 15, 2025   2238 I obtained history and examined the patient as noted above.     Mon Jun 16, 2025   0106 I spoke with Dr. Ward of the hospitalist team regarding the patient.       Additional Documentation  None    Medical Decision Making / Diagnosis     CMS Diagnoses: None    MIPS   None      MDM   Alexus Lynch is a 80 year old female who presents to the emergency department with concerns about injury of her left hip area.  She had walked up the stairs and noticed that she is having increased swelling in her left hip and buttock area.  She feels a large lump there.  She uses her cane.  She has had difficulty since having muscle tears around her hip after a fall 8 months ago.  She also has had injury to her right hamstring.  No falls.  Exam is worrisome for muscle tear or avulsion.  X-ray shows some fullness in her left gluteal area.  Discussed discharging home for follow-up with orthopedics versus staying in the hospital tonight due to impaired mobility.  Ultimately after discussion we will have patient stay overnight due to her impaired mobility.  Spoke  with the hospitalist who will plan for an MRI.  Patient was admitted to the hospitalist service.    Disposition   The patient was admitted to the hospital.     Diagnosis     ICD-10-CM    1. Hip pain, left  M25.552              Scribe Disclosure:  I, Sammy Gomez, am serving as a scribe at 10:26 PM on 6/15/2025 to document services personally performed by Leighton Luna MD based on my observations and the provider's statements to me.        Leighton Luna MD  06/16/25 0791

## 2025-06-16 NOTE — PROGRESS NOTES
Observation goals  PRIOR TO DISCHARGE        Comments: -diagnostic tests and consults completed and resulted: Not met   -vital signs normal or at patient baseline: Met   Nurse to notify provider when observation goals have been met and patient is ready for discharge.

## 2025-06-16 NOTE — PROGRESS NOTES
Admission/Transfer from: ER  2 RN skin assessment completed. Yes, with Vi Derrick   Significant findings include: Small bruise of coccyx and left elbow   WOC Nurse Consult Ordered? No

## 2025-06-16 NOTE — PLAN OF CARE
DATE & SHIFT: 06/16/25 6162-2310  PRIMARY Concern: Left hamstring avulsion   SAFETY RISK Concerns (fall risk, behaviors, etc.): Fall      Aggression Tool Color: Green  Isolation/Type: Noen   Tests/Procedures for NEXT shift: None   Consults? (Pending/following, signed-off?) Ortho and PT  Where is patient from? (Home, TCU, etc.): Home   Other Important info for NEXT shift: Patient does not take opioids for pain; only tylenol and muscle relaxers as needed.  Anticipated DC date & active delays: 6/17/25  _____________________________________________________________________________  SUMMARY NOTE:   Orientation/Cognitive: AOx4  Observation Goals (Met/ Not Met): Not met   Mobility Level/Assist Equipment: A1 GB/W  Pain Management: Tylenol, robaxin, and hydroxizine   Complete Pain Reassessment: Y/N Y Due next: Next shift   Tele/VS/O2: VSS, BP soft, 106/58. RA  ABNL Lab/BG: UN 25.9, creatinine 0.97. GFR 59. Hemoglobin 10.1  Diet: Regular   Bowel/Bladder: Continent, up to BSC  Skin Concerns: Small bruise on coccyx  Drains/Devices: PIV SL  Patient Stated Goal for Today: Rest

## 2025-06-16 NOTE — PROGRESS NOTES
[facility]  Hospitalist Progress Note   06/16/2025          Assessment and Plan:       Alexus Lynch is a 80 year old female with medical history of hypertension, dyslipidemia, GERD, asthma, anemia, polymyalgia rheumatica, pseudogout presents to hospital with left gluteal pain and mass.     Acute avulsion of left hamstrings  Patient presenting with sudden onset left gluteal pain.  Found to have a soft tissue mass.  Likely a gluteal tear.  She had a history of a fall in late 2024 associated with partial tears of the gluteus mark and gluteus minimus.  MRI - Acute avulsion of the left hamstring origin with distal retraction and a 7 x 6 x 7 cm hematoma caudad to the inferior ramus.   --6/16 patient complains of left gluteal pain, not ambulated out of bed this morning.  Orthopedic surgery evaluation.  Physical therapy evaluation requested.  As needed Tylenol for mild pain.  As needed Robaxin as needed for muscle spasm.  As needed Atarax ordered.  Avoid narcotics if able to, patient has not tolerated in the past.  Fall precautions.  Age-appropriate health maintenance as outpatient  Recommend to follow-up with primary rheumatology team after discharge.    Hypertension  Dyslipidemia  Resume PTA losartan.  Hold PTA hydrochlorothiazide.  Hold PTA statin     GERD  Hold PTA meds.    Asthma  No active wheezing.    Polymyalgia rheumatica   Resume PTA prednisone    Pseudogout  Anemia    Clinically Significant Risk Factors Present on Admission                   # Hypertension: Home medication list includes antihypertensive(s)      # Anemia: based on hgb <11      Orders Placed This Encounter      Regular Diet Adult      DVT Prophylaxis: SCDs, ambulate  Code Status: Full Code  Disposition: Expected discharge  6/17 pending clinical improvement, safe discharge plan in place    Medically Ready for Discharge: Anticipated Tomorrow     Discussed with patient, her  by the bedside, bedside RN  More than 70% of time  spent in direct patient care, care coordination, patient counseling, and formalizing plan of care.        Ariadna Siegel MD        Interval History:        Patient lying in bed.  Not ambulated out of bed this morning.  Complains of pain left gluteal area.  No new tingling or numbness.  No new bowel or bladder disturbance.  Afebrile.    No focal weakness.  No abdominal pain.         Physical Exam:        Physical Exam   Temp:  [97.6  F (36.4  C)-98.3  F (36.8  C)] 98.3  F (36.8  C)  Pulse:  [] 74  Resp:  [16-18] 16  BP: (105-138)/(58-88) 112/63  SpO2:  [94 %-98 %] 94 %    PHYSICAL EXAM  GENERAL: Patient is in no distress. Alert and oriented.  LUNGS: Respirations unlabored  ABDOMEN: Soft, no abdominal tenderness, bowel sounds heard   Left hip/gluteal area no mass, no erythema.  Range of motion at left hip slightly restricted due to pain  NEURO: All extremities  EXTREMITIES: No pedal edema.   SKIN: Warm, dry. No rash  PSYCHIATRY Cooperative       Medications:        Current Facility-Administered Medications   Medication Dose Route Frequency Provider Last Rate Last Admin    gabapentin (NEURONTIN) capsule 200 mg  200 mg Oral TID Ariadna Siegel MD   200 mg at 06/16/25 1341    losartan (COZAAR) tablet 100 mg  100 mg Oral Every Other Day Ariadna Siegel MD        predniSONE (DELTASONE) tablet 7 mg  7 mg Oral Daily Ariadna Siegel MD   7 mg at 06/16/25 1341     Current Facility-Administered Medications   Medication Dose Route Frequency Provider Last Rate Last Admin    acetaminophen (TYLENOL) tablet 650 mg  650 mg Oral Q4H PRN Meagan Ward MD   650 mg at 06/16/25 0806    Or    acetaminophen (TYLENOL) Suppository 650 mg  650 mg Rectal Q4H PRN Meagan Ward MD        hydrOXYzine HCl (ATARAX) tablet 25 mg  25 mg Oral Q6H PRN Ariadna Siegel MD        Or    hydrOXYzine HCl (ATARAX) tablet 50 mg  50 mg Oral Q6H PRN Ariadna Siegel MD        melatonin tablet 1 mg  1 mg Oral At Bedtime  PRN Meagan Ward MD        methocarbamol (ROBAXIN) half-tab 250 mg  250 mg Oral TID PRN Ariadna Siegel MD        ondansetron (ZOFRAN ODT) ODT tab 4 mg  4 mg Oral Q6H PRN Meagan Ward MD        Or    ondansetron (ZOFRAN) injection 4 mg  4 mg Intravenous Q6H PRN Meagan Ward MD        prochlorperazine (COMPAZINE) injection 5 mg  5 mg Intravenous Q6H PRN Meagan Ward MD        Or    prochlorperazine (COMPAZINE) tablet 5 mg  5 mg Oral Q6H PRN Meagan Ward MD        senchristie-docusate (SENOKOT-S/PERICOLACE) 8.6-50 MG per tablet 1 tablet  1 tablet Oral BID PRN Meagan Ward MD        Or    senna-docusate (SENOKOT-S/PERICOLACE) 8.6-50 MG per tablet 2 tablet  2 tablet Oral BID PRN Meagan Ward MD                Data:      All new lab and imaging data was reviewed.

## 2025-06-16 NOTE — CONSULTS
[facility]    Orthopedic Consultation    Alexus Lynch MRN# [id number]   Age: 80 year old YOB: 1945     Date of Admission:    6/15/2025    Reason for consult: Left hamstring avulsion injury       Requesting provider: BETITO Ward       Level of consult: One-time consult to assist in determining a diagnosis, recommend an appropriate treatment plan and place orders           Assessment and Plan:   Assessment:   Left hamstring avulsion injury      Plan:   Non-operative management recommended  Okay to WBAT with walker  Progress in therapy as able   Compression hip spica wrap optional if needed for pain control  Follow-up with Dr Ritter in 7-10 days for recheck            Chief Complaint:   Left gluteal pain          History of Present Illness:   Alexus Lynch is a 80 year old female with a PMH of hypertension, dyslipidemia, GERD, asthma, anemia, polymyalgia rheumatica, pseudogout presented to hospital with left gluteal pain and mass. She reports that for the last two weeks she has been having pain in the left gluteal area. She scheduled an appointment at [facility] today but yesterday while at home ambulating she suddenly developed severe pain on the left gluteal area and developed a mass.  The pain was so severe that she could not walk.  Due to the pain she presented to the hospital. Imaging revealed: Acute avulsion of the left hamstring origin with distal retraction and a 7 x 6 x 7 cm hematoma caudad to the inferior ramus.     Of note the patient had a fall in late 2024 and was noted to have tears of her right gluteus mark, gluteus minimus and IT bands on MRI. She was treated conservatively and follows with Dr Ritter.           Past Medical History:     Past Medical History:   Diagnosis Date    Anemia     Asthma     Gastro-oesophageal reflux disease     Hyperlipidemia     Hypertension     Osteopenia     Rhinitis              Past Surgical History:     Past Surgical History:    Procedure Laterality Date    ARTHROPLASTY KNEE BILATERAL  7/3/2013    Procedure: ARTHROPLASTY KNEE BILATERAL;  irrigation and debriedment, POLY EXCHANGE BILATERAL KNEE- ALEX &;  Surgeon: Maurizio Lynch MD;  Location: [facility] OR    KNEE SURGERY               Social History:     Social History     Tobacco Use    Smoking status: Never    Smokeless tobacco: Not on file   Substance Use Topics    Alcohol use: Yes             Family History:   No family history on file.          Immunizations:     VACCINE / DOSE   Diptheria   DPT   DTAP   HBIG   Hepatitis A   Hepatitis B   HIB   Influenza   Measles   Meningococcal   MMR   Mumps   Pneumococcal   Polio   Rubella   Small Pox   TDAP   Varicella   Zoster             Allergies:     Allergies   Allergen Reactions    Nuts Itching     cashews & pistachios    Kiwi Other (See Comments)     Throat itches    Oxycodone Nausea and Nausea and Vomiting    Shellfish-Derived Products     Shrimp Other (See Comments)     Throat starts to itch    Sulfur Unknown    Celebrex [Celecoxib] Rash    Sulfa Antibiotics Rash             Medications:     Current Facility-Administered Medications   Medication Dose Route Frequency Provider Last Rate Last Admin    acetaminophen (TYLENOL) tablet 650 mg  650 mg Oral Q4H PRN Meagan aWrd MD   650 mg at 06/16/25 0806    Or    acetaminophen (TYLENOL) Suppository 650 mg  650 mg Rectal Q4H PRN Meagan Ward MD        gabapentin (NEURONTIN) capsule 200 mg  200 mg Oral TID Ariadna Siegel MD   200 mg at 06/16/25 1341    hydrOXYzine HCl (ATARAX) tablet 25 mg  25 mg Oral Q6H PRN Ariadna Siegel MD        Or    hydrOXYzine HCl (ATARAX) tablet 50 mg  50 mg Oral Q6H PRN Ariadna Siegel MD        losartan (COZAAR) tablet 100 mg  100 mg Oral Every Other Day Ariadna Siegel MD        melatonin tablet 1 mg  1 mg Oral At Bedtime PRN Meagan Ward MD        methocarbamol (ROBAXIN) half-tab 250 mg  250 mg Oral TID PRN Robbin  MD Ariadna        ondansetron (ZOFRAN ODT) ODT tab 4 mg  4 mg Oral Q6H PRN Meagan Ward MD        Or    ondansetron (ZOFRAN) injection 4 mg  4 mg Intravenous Q6H PRN Meagan Ward MD        predniSONE (DELTASONE) tablet 7 mg  7 mg Oral Daily Ariadna Siegel MD   7 mg at 06/16/25 1341    prochlorperazine (COMPAZINE) injection 5 mg  5 mg Intravenous Q6H PRN Meagan Ward MD        Or    prochlorperazine (COMPAZINE) tablet 5 mg  5 mg Oral Q6H PRN Meagan Ward MD        senna-docusate (SENOKOT-S/PERICOLACE) 8.6-50 MG per tablet 1 tablet  1 tablet Oral BID PRN Meagan Ward MD        Or    senna-docusate (SENOKOT-S/PERICOLACE) 8.6-50 MG per tablet 2 tablet  2 tablet Oral BID PRN Meagan Ward MD                 Review of Systems:   ROS:  10 point ROS neg other than the symptoms noted above in the HPI.            Physical Exam:   All vitals have been reviewed  Patient Vitals for the past 24 hrs:   BP Temp Temp src Pulse Resp SpO2   06/16/25 1546 112/63 98.3  F (36.8  C) Oral 74 16 94 %   06/16/25 1219 106/58 -- -- 74 -- 96 %   06/16/25 0948 132/76 97.9  F (36.6  C) Oral 74 -- 95 %   06/16/25 0730 110/81 -- -- 67 -- --   06/16/25 0700 130/78 -- -- 61 -- --   06/16/25 0530 105/67 -- -- 60 -- 98 %   06/16/25 0453 -- -- -- -- -- 95 %   06/16/25 0439 -- -- -- -- -- 96 %   06/16/25 0430 126/84 -- -- 66 -- --   06/16/25 0330 128/80 -- -- 63 -- 95 %   06/16/25 0230 119/80 -- -- 63 -- --   06/16/25 0200 138/85 -- -- 66 -- --   06/16/25 0137 -- -- -- -- -- 97 %   06/16/25 0130 119/82 -- -- 84 18 96 %   06/16/25 0030 138/76 -- -- 74 16 98 %   06/15/25 2330 113/79 -- -- 74 16 97 %   06/15/25 2230 137/88 -- -- 100 18 96 %   06/15/25 2145 124/77 97.6  F (36.4  C) Temporal 101 18 98 %     No intake or output data in the 24 hours ending 06/16/25 1627      Physical Exam   Temp: 98.3  F (36.8  C) Temp src: Oral BP: 112/63 Pulse: 74   Resp: 16 SpO2: 94 % O2 Device: None  (Room air)    Vital Signs with Ranges  Temp:  [97.6  F (36.4  C)-98.3  F (36.8  C)] 98.3  F (36.8  C)  Pulse:  [] 74  Resp:  [16-18] 16  BP: (105-138)/(58-88) 112/63  SpO2:  [94 %-98 %] 94 %  0 lbs 0 oz    Constitutional: Pleasant, alert, appropriate, following commands.  HEENT: Head atraumatic normocephalic.   Respiratory: Unlabored breathing no audible wheeze  Cardiovascular: Regular rate and rhythm per pulses  GI: Abdomen non-distended.  Lymph/Hematologic: No lymphadenopathy in areas examined  Genitourinary:  No herr  Skin: No rashes, no cyanosis, no edema.  Musculoskeletal: On physical exam of the patient's right lower extremity, the skin is intact without erythema or significant ecchymosis.  There is a palpable amount of swelling in the gluteal tendon insertional area.  This is tender to palpation.  She does have increased pain at the end range of straight leg raise and with active knee flexion in the gluteal region.  Denies pain with passive hip rotation.  Patient notes equal sensation to light touch on the left versus right lower extremities and distal pulses are intact equal bilaterally.  Neurologic: normal without focal findings, speech normal, alert and oriented x iii  Neuropsychiatric: stable             Data:   All laboratory data reviewed  Results for orders placed or performed during the hospital encounter of 06/15/25   XR Pelvis and Hip Left 1 View     Status: None    Narrative    EXAM: XR PELVIS AND HIP LEFT 1 VIEW  LOCATION: [facility]  DATE: 6/15/2025    INDICATION: Left hip pain, sudden onset, suspected gluteus mark rupture.  COMPARISON: X-ray pelvis AP view with left hip single view 8/21/2024.      Impression    IMPRESSION: Degenerative changes lower lumbar spine and joints of the pelvis including the left hip joint. No acute fracture or dislocation. New onset soft tissue swelling in the left gluteal region. If there is continued clinical concern for soft  tissue   injury, consider dedicated MR for further assessment, if technically feasible.   MR Pelvis Muscular Tissue wo Contrast     Status: None    Narrative    EXAM: MR PELVIS MUSCULAR TISSUE W/O CONTRAST  LOCATION: [facility]  DATE: 6/16/2025    INDICATION: left gluteal tear  COMPARISON: X-rays Jeannie 15, 2025 August 21, 2024.  TECHNIQUE: Unenhanced.    FINDINGS:     Normal marrow signal throughout the pelvis and proximal femurs. Normal volume of fluid at the hips. Chronic appearing arthritic change at the sacroiliac joints.    Soft tissue edema throughout the left gluteus maximum middle and inferior fibers which remain intact. There is also edema along the obturator internus muscle and left pelvic sidewall.    Heterogeneous high T2 low T1 signal collection directly caudad to the inferior ramus at the left hamstring origin measures 6 x 7 cm axial 7 cm craniocaudad and is suspected to represent hematoma.    Hamstring origin appears avulsed and distally retracted about 6 cm.    Fatty atrophic change of the gluteus minimus muscles bilaterally in the left gluteus medius.    Advanced degenerative change in the lower lumbar spine. Sigmoid diverticulosis.      Impression    IMPRESSION:  1.  Acute avulsion of the left hamstring origin with distal retraction and a 7 x 6 x 7 cm hematoma caudad to the inferior ramus.     Basic metabolic panel     Status: Abnormal   Result Value Ref Range    Sodium 142 135 - 145 mmol/L    Potassium 4.1 3.4 - 5.3 mmol/L    Chloride 107 98 - 107 mmol/L    Carbon Dioxide (CO2) 25 22 - 29 mmol/L    Anion Gap 10 7 - 15 mmol/L    Urea Nitrogen 31.0 (H) 8.0 - 23.0 mg/dL    Creatinine 1.05 (H) 0.51 - 0.95 mg/dL    GFR Estimate 54 (L) >60 mL/min/1.73m2    Calcium 8.7 (L) 8.8 - 10.4 mg/dL    Glucose 108 (H) 70 - 99 mg/dL   CBC with platelets and differential     Status: Abnormal   Result Value Ref Range    WBC Count 10.0 4.0 - 11.0 10e3/uL    RBC Count 3.46 (L) 3.80 - 5.20  10e6/uL    Hemoglobin 10.4 (L) 11.7 - 15.7 g/dL    Hematocrit 32.6 (L) 35.0 - 47.0 %    MCV 94 78 - 100 fL    MCH 30.1 26.5 - 33.0 pg    MCHC 31.9 31.5 - 36.5 g/dL    RDW 12.3 10.0 - 15.0 %    Platelet Count 257 150 - 450 10e3/uL    % Neutrophils 81 %    % Lymphocytes 9 %    % Monocytes 7 %    % Eosinophils 1 %    % Basophils 1 %    % Immature Granulocytes 0 %    NRBCs per 100 WBC 0 <1 /100    Absolute Neutrophils 8.2 1.6 - 8.3 10e3/uL    Absolute Lymphocytes 0.9 0.8 - 5.3 10e3/uL    Absolute Monocytes 0.7 0.0 - 1.3 10e3/uL    Absolute Eosinophils 0.1 0.0 - 0.7 10e3/uL    Absolute Basophils 0.1 0.0 - 0.2 10e3/uL    Absolute Immature Granulocytes 0.0 <=0.4 10e3/uL    Absolute NRBCs 0.0 10e3/uL   Basic metabolic panel     Status: Abnormal   Result Value Ref Range    Sodium 141 135 - 145 mmol/L    Potassium 3.9 3.4 - 5.3 mmol/L    Chloride 107 98 - 107 mmol/L    Carbon Dioxide (CO2) 25 22 - 29 mmol/L    Anion Gap 9 7 - 15 mmol/L    Urea Nitrogen 25.9 (H) 8.0 - 23.0 mg/dL    Creatinine 0.97 (H) 0.51 - 0.95 mg/dL    GFR Estimate 59 (L) >60 mL/min/1.73m2    Calcium 8.6 (L) 8.8 - 10.4 mg/dL    Glucose 93 70 - 99 mg/dL   CBC with platelets     Status: Abnormal   Result Value Ref Range    WBC Count 10.5 4.0 - 11.0 10e3/uL    RBC Count 3.26 (L) 3.80 - 5.20 10e6/uL    Hemoglobin 10.1 (L) 11.7 - 15.7 g/dL    Hematocrit 30.1 (L) 35.0 - 47.0 %    MCV 92 78 - 100 fL    MCH 31.0 26.5 - 33.0 pg    MCHC 33.6 31.5 - 36.5 g/dL    RDW 12.4 10.0 - 15.0 %    Platelet Count 251 150 - 450 10e3/uL   CBC with platelets differential     Status: Abnormal    Narrative    The following orders were created for panel order CBC with platelets differential.  Procedure                               Abnormality         Status                     ---------                               -----------         ------                     CBC with platelets and ...[[id number]]  Abnormal            Final result                 Please view results for these  tests on the individual orders.          Attestation:  I have reviewed today's vital signs, notes, medications, labs and imaging with Dr. Holm.  Amount of time performed on this consult: 50 minutes.    Allison Piper PA-C

## 2025-06-16 NOTE — PROGRESS NOTES
RECEIVING UNIT ED HANDOFF REVIEW    ED Nurse Handoff Report was reviewed by: Aden Leary RN on June 16, 2025 at 8:59 AM

## 2025-06-16 NOTE — PHARMACY-ADMISSION MEDICATION HISTORY
Pharmacist Admission Medication History    Admission medication history is complete. The information provided in this note is only as accurate as the sources available at the time of the update.    Information Source(s): Patient and CareEverywhere/SureScripts via in-person    Pertinent Information:     Changes made to PTA medication list:  Added: gabapentin, leflunomide, losartan, prednisone  Deleted: methotrexate  Changed: losartan-hydrochlorothiazide, calcium, qvar    Allergies reviewed with patient and updates made in EHR: no    Medication History Completed By: Zohra Fields RPH 6/16/2025 8:55 AM    PTA Med List   Medication Sig Last Dose/Taking    acetaminophen 650 MG TABS Take 650 mg by mouth every 4 hours as needed. Taking As Needed    acetaminophen-caffeine (EXCEDRIN TENSION HEADACHE) 500-65 MG TABS Take 2 tablets by mouth every 6 hours as needed. Taking As Needed    atorvastatin (LIPITOR) 10 MG tablet Take 10 mg by mouth at bedtime. 6/15/2025    beclomethasone HFA (QVAR REDIHALER) 80 MCG/ACT inhaler Inhale 1 puff into the lungs 2 times daily. 6/15/2025    calcium-vitamin D (CALTRATE) 600-400 MG-UNIT per tablet Take 1 tablet by mouth at bedtime. 6/15/2025    Fexofenadine HCl (ALLEGRA PO) Take 180 mg by mouth daily as needed  Taking As Needed    gabapentin (NEURONTIN) 100 MG capsule Take 200 mg by mouth 3 times daily. 6/15/2025    IBUPROFEN PO Take 200 mg by mouth daily as needed Takes with Excedrin for arthritis Taking As Needed    leflunomide (ARAVA) 20 MG tablet Take 20 mg by mouth daily. 6/15/2025    losartan (COZAAR) 100 MG tablet Take 100 mg by mouth every other day. Alternate with losartan-hydrochlorothiazide Past Week Morning    losartan-hydrochlorothiazide (HYZAAR) 100-12.5 MG per tablet Take 1 tablet by mouth every other day. Alternate with losartan Past Week Morning    multivitamin, therapeutic with minerals (MULTI-VITAMIN) TABS Take 1 tablet by mouth At Bedtime  6/15/2025    OMEPRAZOLE PO Take 20 mg by  mouth daily as needed. Taking As Needed    predniSONE (DELTASONE) 1 MG tablet Take 2 mg by mouth daily. Take in addition to 5 mg tab for a total of 7 mg 6/15/2025    predniSONE (DELTASONE) 5 MG tablet Take 5 mg by mouth daily. Take in addition to 1 mg tabs for a total of 7 mg 6/15/2025

## 2025-06-16 NOTE — ED TRIAGE NOTES
Patient here here with left hip pain and swelling which started tonight. She stated she fell 8 months ago.      Triage Assessment (Adult)       Row Name 06/15/25 8209          Triage Assessment    Airway WDL WDL        Respiratory WDL    Respiratory WDL WDL        Skin Circulation/Temperature WDL    Skin Circulation/Temperature WDL WDL        Cardiac WDL    Cardiac WDL WDL        Peripheral/Neurovascular WDL    Peripheral Neurovascular WDL WDL        Cognitive/Neuro/Behavioral WDL    Cognitive/Neuro/Behavioral WDL WDL

## 2025-06-16 NOTE — H&P
[facility]  Hospitalist History and Physical  Date of Admission:  6/15/2025    Primary Care Physician   Thierry Ochoa    Chief Complaint  Hip Pain    History obtained from the patient    History of Present Illness   Alexus Lynch is a 80 year old female with a past medical history of hypertension, dyslipidemia, GERD, asthma, anemia, polymyalgia rheumatica, pseudogout presents to hospital with left gluteal pain and mass. She reports that for the last two weeks she has been having pain in the left gluteal area. She scheduled an appointment to see [facility] on Monday 6/16. On 6/15 while at home ambulating she suddenly developed severe pain on the left gluteal area and developed a mass.  The pain was so severe that she could not walk.  Due to the pain she presented to the hospital.  Of note the patient had a fall in late 2024 and was noted to have tears of her gluteus mark, gluteus minimus and IT bands on MRI.  She was treated conservatively.    Past Medical History    I have reviewed this patient's medical history and updated it with pertinent information if needed.   Past Medical History:   Diagnosis Date    Anemia     Asthma     Gastro-oesophageal reflux disease     Hyperlipidemia     Hypertension     Osteopenia     Rhinitis        Past Surgical History   I have reviewed this patient's surgical history and updated it with pertinent information if needed.  Past Surgical History:   Procedure Laterality Date    ARTHROPLASTY KNEE BILATERAL  7/3/2013    Procedure: ARTHROPLASTY KNEE BILATERAL;  irrigation and debriedment, POLY EXCHANGE BILATERAL KNEE- ALEX &;  Surgeon: Maurizio Lynch MD;  Location: [facility] OR    KNEE SURGERY         Allergies   Allergies   Allergen Reactions    Nuts Itching     cashews & pistachios    Kiwi Other (See Comments)     Throat itches    Oxycodone Nausea and Nausea and Vomiting    Shellfish-Derived Products     Shrimp Other (See Comments)     Throat starts to itch     Sulfur Unknown    Celebrex [Celecoxib] Rash    Sulfa Antibiotics Rash       Social History   I have reviewed this patient's social history and updated it with pertinent information if needed. Alexus Lynch  reports that she has never smoked. She does not have any smokeless tobacco history on file. She reports current alcohol use. She reports that she does not use drugs.    Family History   I have reviewed this patient's family history and updated it with pertinent information if needed.   No family history on file.    Physical Exam   Temp: 97.6  F (36.4  C) Temp src: Temporal BP: 138/76 Pulse: 74   Resp: 16 SpO2: 98 % O2 Device: None (Room air)    Vital Signs with Ranges  Temp:  [97.6  F (36.4  C)] 97.6  F (36.4  C)  Pulse:  [] 74  Resp:  [16-18] 16  BP: (113-138)/(76-88) 138/76  SpO2:  [96 %-98 %] 98 %  0 lbs 0 oz  Physical Exam  Vitals reviewed.   Constitutional:       Appearance: Normal appearance.      Comments: Pleasant elderly lady seen resting in bed comfortably no apparent distress in the emergency room.   HENT:      Head: Normocephalic and atraumatic.   Cardiovascular:      Rate and Rhythm: Normal rate and regular rhythm.   Pulmonary:      Effort: Pulmonary effort is normal.      Breath sounds: Normal breath sounds.   Abdominal:      General: Abdomen is flat. Bowel sounds are normal.      Palpations: Abdomen is soft.   Musculoskeletal:         General: Swelling present.      Comments: Left gluteal mass.  No fluctuance.  No erythema.   Neurological:      General: No focal deficit present.      Mental Status: She is alert and oriented to person, place, and time. Mental status is at baseline.         Assessment & Plan   Alexus Lynch is a 80 year old female with a past medical history of hypertension, dyslipidemia, GERD, asthma, anemia, polymyalgia rheumatica, pseudogout presents to hospital with left gluteal pain and mass.    Suspected left gluteal tear  Patient presenting with sudden onset left gluteal  pain.  Found to have a soft tissue mass.  Likely a gluteal tear.  She had a history of a fall in late 2024 associated with partial tears of the gluteus mark and gluteus minimus.  -Follow-up MRI soft tissue  -PT consult  -Ortho consult    Chronic medical conditions: -Resume PTA meds once pharmacy med rec is complete  Hypertension  Dyslipidemia  GERD  Asthma  Polymyalgia rheumatica -on low-dose steroids  Pseudogout  Anemia    DVT ppx: Ambulate  Code Status: Full code  Medically Ready for Discharge: Anticipated in 2-4 Days    Medical Decision Making       65 MINUTES SPENT BY ME on the date of service doing chart review, history, exam, documentation & further activities per the note.      Meagan Ward MD  Hospitalist Medicine Service  Pager# [phone]

## 2025-06-17 ENCOUNTER — APPOINTMENT (OUTPATIENT)
Dept: PHYSICAL THERAPY | Facility: CLINIC | Age: 80
DRG: 914 | End: 2025-06-17
Attending: HOSPITALIST
Payer: COMMERCIAL

## 2025-06-17 VITALS
TEMPERATURE: 98 F | DIASTOLIC BLOOD PRESSURE: 74 MMHG | OXYGEN SATURATION: 96 % | RESPIRATION RATE: 18 BRPM | SYSTOLIC BLOOD PRESSURE: 121 MMHG | HEART RATE: 69 BPM

## 2025-06-17 LAB
HGB BLD-MCNC: 10.3 G/DL (ref 11.7–15.7)
MCV RBC AUTO: 93 FL (ref 78–100)

## 2025-06-17 PROCEDURE — 85018 HEMOGLOBIN: CPT | Performed by: HOSPITALIST

## 2025-06-17 PROCEDURE — 97161 PT EVAL LOW COMPLEX 20 MIN: CPT | Mod: GP

## 2025-06-17 PROCEDURE — 250N000013 HC RX MED GY IP 250 OP 250 PS 637: Performed by: HOSPITALIST

## 2025-06-17 PROCEDURE — 97116 GAIT TRAINING THERAPY: CPT | Mod: GP

## 2025-06-17 PROCEDURE — 99239 HOSP IP/OBS DSCHRG MGMT >30: CPT | Performed by: HOSPITALIST

## 2025-06-17 PROCEDURE — 250N000012 HC RX MED GY IP 250 OP 636 PS 637: Performed by: HOSPITALIST

## 2025-06-17 PROCEDURE — 36415 COLL VENOUS BLD VENIPUNCTURE: CPT | Performed by: HOSPITALIST

## 2025-06-17 PROCEDURE — 97530 THERAPEUTIC ACTIVITIES: CPT | Mod: GP

## 2025-06-17 RX ORDER — HYDROXYZINE HYDROCHLORIDE 25 MG/1
25 TABLET, FILM COATED ORAL EVERY 6 HOURS PRN
Qty: 10 TABLET | Refills: 0 | Status: SHIPPED | OUTPATIENT
Start: 2025-06-17

## 2025-06-17 RX ORDER — METHOCARBAMOL 500 MG/1
250 TABLET, FILM COATED ORAL 3 TIMES DAILY PRN
Qty: 12 TABLET | Refills: 0 | Status: SHIPPED | OUTPATIENT
Start: 2025-06-17

## 2025-06-17 RX ADMIN — PREDNISONE 7 MG: 5 TABLET ORAL at 08:03

## 2025-06-17 RX ADMIN — LEFLUNOMIDE 20 MG: 20 TABLET ORAL at 08:03

## 2025-06-17 RX ADMIN — ACETAMINOPHEN 650 MG: 325 TABLET, FILM COATED ORAL at 04:35

## 2025-06-17 RX ADMIN — GABAPENTIN 200 MG: 100 CAPSULE ORAL at 08:03

## 2025-06-17 ASSESSMENT — ACTIVITIES OF DAILY LIVING (ADL)
ADLS_ACUITY_SCORE: 54
ADLS_ACUITY_SCORE: 55
ADLS_ACUITY_SCORE: 54

## 2025-06-17 NOTE — PROGRESS NOTES
06/17/25 0930   Appointment Info   Signing Clinician's Name / Credentials (PT) Markus Herron DPT   Rehab Comments (PT) WBAT   Living Environment   People in Home spouse   Current Living Arrangements house   Home Accessibility stairs to enter home;stairs within home   Number of Stairs, Main Entrance 3   Stair Railings, Main Entrance railings safe and in good condition   Number of Stairs, Within Home, Primary greater than 10 stairs   Stair Railings, Within Home, Primary railings safe and in good condition   Transportation Anticipated family or friend will provide   Living Environment Comments Reports living in a house w/ spouse. Few stairs to enter w/ railing. Notes bedroom on second floor with railing.   Self-Care   Usual Activity Tolerance good   Current Activity Tolerance moderate   Equipment Currently Used at Home cane, straight   Fall history within last six months yes   Number of times patient has fallen within last six months 2   Activity/Exercise/Self-Care Comment Reports that she typically ambulates w/ SEC. Independent w/ ADLs.   General Information   Onset of Illness/Injury or Date of Surgery 06/16/25   Referring Physician Meagan Ward MD   Patient/Family Therapy Goals Statement (PT) Return to home   Pertinent History of Current Problem (include personal factors and/or comorbidities that impact the POC) Alexus Lynch is a 80 year old female with a past medical history of hypertension, dyslipidemia, GERD, asthma, anemia, polymyalgia rheumatica, pseudogout presents to hospital with left gluteal pain and mass. She reports that for the last two weeks she has been having pain in the left gluteal area. She scheduled an appointment to see [facility] on Monday 6/16. On 6/15 while at home ambulating she suddenly developed severe pain on the left gluteal area and developed a mass.  The pain was so severe that she could not walk.  Due to the pain she presented to the hospital.   Existing Precautions/Restrictions  fall   Weight-Bearing Status - LLE weight-bearing as tolerated   Weight-Bearing Status - RLE weight-bearing as tolerated   Cognition   Affect/Mental Status (Cognition) WFL   Orientation Status (Cognition) oriented x 4   Follows Commands (Cognition) WFL   Pain Assessment   Patient Currently in Pain Yes, see Vital Sign flowsheet  (Notes R hip pain but denied L hip pain)   Integumentary/Edema   Integumentary/Edema no deficits were identifed   Range of Motion (ROM)   ROM Comment WFLs for mobility and transfers no formal testing completed   Strength (Manual Muscle Testing)   Strength Comments WFLs for mobility and transfers no formal testing completed   Bed Mobility   Comment, (Bed Mobility) Supine to sitting EOB w/ Mod I   Transfers   Comment, (Transfers) Sit to stand w/ HHA and CGA   Gait/Stairs (Locomotion)   Comment, (Gait/Stairs) 5 ft w/ HHA and CGA   Balance   Balance Comments No overt LOB noted   Clinical Impression   Criteria for Skilled Therapeutic Intervention Yes, treatment indicated   PT Diagnosis (PT) Impaired ambulation   Influenced by the following impairments Impaired strength, balance and activity tolerance   Functional limitations due to impairments Impaired ADLs, IADLs and functional mobility   Clinical Presentation (PT Evaluation Complexity) stable   Clinical Presentation Rationale Clinical judgment   Clinical Decision Making (Complexity) low complexity   Planned Therapy Interventions (PT) balance training;bed mobility training;gait training;home exercise program;patient/family education;stair training;strengthening;transfer training;progressive activity/exercise   Risk & Benefits of therapy have been explained evaluation/treatment results reviewed;care plan/treatment goals reviewed;risks/benefits reviewed;current/potential barriers reviewed;participants voiced agreement with care plan;participants included;patient   PT Total Evaluation Time   PT Eval, Low Complexity Minutes (33289) 10   Physical  Therapy Goals   PT Frequency Daily   PT Predicted Duration/Target Date for Goal Attainment 06/27/25   PT Goals Bed Mobility;Transfers;Gait;Stairs   PT: Bed Mobility Independent;Supine to/from sit   PT: Transfers Modified independent;Sit to/from stand;Assistive device   PT: Gait Modified independent;Straight cane;Greater than 200 feet   PT: Stairs Independent;Greater than 10 stairs;Rail on left   Interventions   Interventions Quick Adds Therapeutic Activity;Gait Training   Therapeutic Activity   Therapeutic Activities: dynamic activities to improve functional performance Minutes (73597) 8   Symptoms Noted During/After Treatment Fatigue;Increased pain   Treatment Detail/Skilled Intervention Pt supine in bed at start of session. Agreeable to PT. Reports needing to use bathroom. Ambulating to bathroom w/ HHA and CGA. Able to complete own pericares. Ambulating to sink w/o AD and SBA. Ambulating back to EOB w/ SEC and CGA. Sit to stand x3 w/ SEC and CGA progressing to SBA. Sit to supine w/ SBA. All needs met at end of session w/ call light in place and bed alarm on.   Gait Training   Gait Training Minutes (59198) 15   Symptoms Noted During/After Treatment (Gait Training) fatigue;increased pain   Treatment Detail/Skilled Intervention Pt ambulating ~200 ft x1 and 150 ft x1 w/ SEC and CGA progressing to SBA. On initial ambulation also completing w/ HHA for partially on opposite hand but able to progress away from this. Antalgic pattern but no LOB. Pt able to complete x9 stairs w/ SBA. Use of L rail on initial x3, R rail on next x3 and BUEs on same rail for last x3 sideways. Pt completing w/ step to pattern. Good tolerance noted.   PT Discharge Planning   PT Plan Activity tolerance, review stairs, LE strengthening   PT Discharge Recommendation (DC Rec) home with outpatient physical therapy   PT Rationale for DC Rec Anticipate Pt near baseline mobility. Ambulating w/ SEC and SBA. Reports goes to OP PT every x2 weeks.  Anticipate when medically ready Pt can return to home w/ assistance from spouse and OP PT.   PT Brief overview of current status Goals of therapy will be to address safe mobility and make recs for d/c to next level of care. Pt and RN will continue to follow all falls risk precautions as documented by RN staff while hospitalized   PT Total Distance Amb During Session (feet) 350   Physical Therapy Time and Intention   Timed Code Treatment Minutes 23   Total Session Time (sum of timed and untimed services) 33

## 2025-06-17 NOTE — PLAN OF CARE
Goal Outcome Evaluation:      Plan of Care Reviewed With: patient    Overall Patient Progress: improvingOverall Patient Progress: improving    DATE & SHIFT: 6/17/25 3210-0901  PRIMARY Concern: L gluteal pain and mass, L hamstring avulsion   SAFETY RISK Concerns (fall risk, behaviors, etc.): Fall risk       Aggression Tool Color: Green  Isolation/Type: NA  Tests/Procedures for NEXT shift: NA  Consults? (Pending/following, signed-off?) PT consult  Where is patient from? (Home, TCU, etc.): Home  Other Important info for NEXT shift: NA  Anticipated DC date & active delays: TBD 1-2 days  _____________________________________________________________________________  SUMMARY NOTE:   Orientation/Cognitive: A&Ox4  Observation Goals (Met/ Not Met): INP  Mobility Level/Assist Equipment: Ax1, GB, walker  Antibiotics & Plan (IV/po, length of tx left): NA  Pain Management: 5/10 L buttock pain managed w/ tylenol  Complete Pain Reassessment: Y/N N Due next: NA  Tele/VS/O2: VSS on RA  ABNL Lab/BG: Creat:0.97 Hgb:10.1  Diet: Regular  Bowel/Bladder: Continent  Skin Concerns: Bruise to R shin  and L buttock  Drains/Devices: PIV:S/L  Patient Stated Goal for Today: To Rest

## 2025-06-17 NOTE — DISCHARGE SUMMARY
Discharge Summary  Hospitalist    Date of Admission:  6/15/2025  Date of Discharge:  6/17/2025  Discharging Provider: Ariadna Siegel MD    Primary Care Physician   Thierry Ochoa  Primary Care Provider Phone Number: [phone]  Primary Care Provider Fax Number: [phone]    PRINCIPAL DIAGNOSIS  Acute avulsion of left hamstrings, associated hematoma.  Acute on chronic anemia.    Past Medical History:   Diagnosis Date    Anemia     Asthma     Gastro-oesophageal reflux disease     Hyperlipidemia     Hypertension     Osteopenia     Rhinitis        History of Present Illness   Alexus Lynch is an 80 year old female who presented with left gluteal pain    Hospital Course     lAexus Lynch is a 80 year old female with medical history of hypertension, dyslipidemia, GERD, asthma, anemia, polymyalgia rheumatica, pseudogout presents to hospital with left gluteal pain and mass.     Acute avulsion of left hamstrings, associated hematoma.  History of osteoporosis.  History of inflammatory polyarthropathy and probable polymyalgia rheumatica on steroids.  Patient follows with rheumatology, on chronic steroids.  History of tear of right gluteus mark, gluteus minimus.  -- Now presented with sudden onset left gluteal pain. Found to have a soft tissue mass.  Likely a gluteal tear.    X-ray pelvis-No acute fracture or dislocation. New onset soft tissue swelling in the left gluteal region   MRI-Acute avulsion of the left hamstring origin with distal retraction and a 7 x 6 x 7 cm hematoma caudad to the inferior ramus.   --Patient was managed conservatively, with Tylenol, as needed Robaxin and Atarax pain controlled.  --Orthopedic surgery followed, nonoperative management.  Physical therapy followed.  Weightbearing as tolerated with walker.  Fall precautions.  As needed Tylenol for mild pain.  As needed Robaxin as needed for muscle spasm.  As needed Atarax.  Continue PTA Arava, prednisone.  Outpatient physical  therapy.  Follow-up with [facility] in 7 to 10 days. If ongoing pain recommend to reach out with [facility] orthopedics surgery. ([facility] [place] or [place] offices. Care coordinator Elizabeth Small can be reached at [phone])  Age-appropriate health maintenance including bone health maintenance as outpatient.  Follow-up with primary rheumatology team per schedule.    Acute on chronic anemia.  Baseline hemoglobin on chart review in 11-12 range.  Presented with hemoglobin of 10.4, recheck hemoglobin 10.3 on day of discharge.  No active bleeding.  Follow hemoglobin level in 7-10 days or earlier if symptomatic.  Avoid blood thinners, NSAIDs until PCP visit      History of chronic renal insufficiency.  Baseline creatinine between 1.0-1.1.  Creatinine at around previous baseline.  Monitor periodically, avoid nephrotoxic drugs including NSAIDs.    Hypertension  Dyslipidemia  Continue PTA losartan, hydrochlorothiazide.  Continue PTA statin therapy.  Monitor daily blood pressure, heart rate review on provider visit and optimize therapy.      GERD  Continue PTA meds     Asthma  No active wheezing.  Continue PTA inhalers       Ariadna Siegel MD.    Pending Results   Unresulted Labs Ordered in the Past 30 Days of this Admission       No orders found from 5/16/2025 to 6/16/2025.               Physical Exam   Vital Signs with Ranges  Temp:  [98  F (36.7  C)-98.3  F (36.8  C)] 98  F (36.7  C)  Pulse:  [64-85] 69  Resp:  [16-18] 18  BP: ()/(52-74) 121/74  SpO2:  [94 %-99 %] 96 %  PHYSICAL EXAM  GENERAL: Patient is in no distress. Alert and oriented.  LUNGS: Respirations unlabored  ABDOMEN: Soft, no abdominal tenderness, bowel sounds heard   Left hip/gluteal area -no ecchymosis or erythema.  Swelling in the gluteal tendon area.  Range of motion at left hip slightly restricted due to pain.  NEURO: All extremities  EXTREMITIES: No pedal edema.   SKIN: Warm, dry. No rash  PSYCHIATRY Cooperative    ORTHOPEDIC SURGERY IP CONSULT  PHYSICAL  THERAPY ADULT IP CONSULT    Time Spent on this Encounter   I, Ariadna Siegel MD, personally saw the patient today and spent greater than 30 minutes discharging this patient. Discussed with patient, her  by bedside, bedside RN, care team.     Discharge Disposition   Discharged to home  Condition at discharge: Stable    Discharge Orders      Physical Therapy  Referral      Reason for your hospital stay    You were admitted to the hospital left gluteal pain, found to have acute left hamstring avulsion injury.  Followed by hospitalist team, orthopedic surgery.  Plan for discharge with close follow-up.     Activity    Weightbearing as tolerated with walker.  Outpatient physical therapy.     Follow Up    Follow hemoglobin level, creatinine in 7-10 days or earlier if symptomatic.  Follow up with Orthopedic surgery in clinic in 7- 10 days. If ongoing pain recommend to reach out with [facility] orthopedics surgery. ([facility] or [place] offices. Care coordinator Elizabeth Small can be reached at [phone])  Age-appropriate health maintenance including bone health maintenance as outpatient.  Follow-up with primary rheumatology team per schedule.     Discharge Instructions    Fall precautions.  Avoid blood thinning medication, NSAIDs until PCP visit.     Monitor and record    Monitor daily blood pressure, heart rate review on provider visit and optimize.  Hold prior to admission losartan/hydrochlorothiazide if systolic blood pressure less than 110.     Diet    Follow this diet upon discharge: Current Diet:Orders Placed This Encounter      Regular Diet Adult     Hospital Follow-up with Existing Primary Care Provider (PCP)            Discharge Medications   Current Discharge Medication List        START taking these medications    Details   hydrOXYzine HCl (ATARAX) 25 MG tablet Take 1 tablet (25 mg) by mouth every 6 hours as needed for other (adjuvant pain).  Qty: 10 tablet, Refills: 0    Associated Diagnoses:  Hip pain, left      methocarbamol (ROBAXIN) 500 MG tablet Take 0.5 tablets (250 mg) by mouth 3 times daily as needed for muscle spasms.  Qty: 12 tablet, Refills: 0    Associated Diagnoses: Hip pain, left           CONTINUE these medications which have NOT CHANGED    Details   acetaminophen 650 MG TABS Take 650 mg by mouth every 4 hours as needed.  Qty: 100 tablet    Associated Diagnoses: Joint pain      atorvastatin (LIPITOR) 10 MG tablet Take 10 mg by mouth at bedtime.      beclomethasone HFA (QVAR REDIHALER) 80 MCG/ACT inhaler Inhale 1 puff into the lungs 2 times daily.      calcium-vitamin D (CALTRATE) 600-400 MG-UNIT per tablet Take 1 tablet by mouth at bedtime.      Fexofenadine HCl (ALLEGRA PO) Take 180 mg by mouth daily as needed       gabapentin (NEURONTIN) 100 MG capsule Take 200 mg by mouth 3 times daily.      leflunomide (ARAVA) 20 MG tablet Take 20 mg by mouth daily.      losartan (COZAAR) 100 MG tablet Take 100 mg by mouth every other day. Alternate with losartan-hydrochlorothiazide      losartan-hydrochlorothiazide (HYZAAR) 100-12.5 MG per tablet Take 1 tablet by mouth every other day. Alternate with losartan      multivitamin, therapeutic with minerals (MULTI-VITAMIN) TABS Take 1 tablet by mouth At Bedtime       OMEPRAZOLE PO Take 20 mg by mouth daily as needed.      !! predniSONE (DELTASONE) 1 MG tablet Take 2 mg by mouth daily. Take in addition to 5 mg tab for a total of 7 mg      !! predniSONE (DELTASONE) 5 MG tablet Take 5 mg by mouth daily. Take in addition to 1 mg tabs for a total of 7 mg       !! - Potential duplicate medications found. Please discuss with provider.        STOP taking these medications       acetaminophen-caffeine (EXCEDRIN TENSION HEADACHE) 500-65 MG TABS Comments:   Reason for Stopping:         IBUPROFEN PO Comments:   Reason for Stopping:             Allergies   Allergies   Allergen Reactions    Nuts Itching     cashews & pistachios    Kiwi Other (See Comments)     Throat  itches    Oxycodone Nausea and Nausea and Vomiting    Shellfish-Derived Products     Shrimp Other (See Comments)     Throat starts to itch    Sulfur Unknown    Celebrex [Celecoxib] Rash    Sulfa Antibiotics Rash       DATA  Most Recent 3 CBC's:  Recent Labs   Lab Test 06/16/25  0601 06/16/25  0026 10/26/23  1240   WBC 10.5 10.0 18.8*   HGB 10.1* 10.4* 13.0   MCV 92 94 95    257 284      Most Recent 3 BMP's:  Recent Labs   Lab Test 06/16/25  0601 06/16/25  0026 10/26/23  1240    142 139   POTASSIUM 3.9 4.1 3.2*   CHLORIDE 107 107 101   CO2 25 25 26   BUN 25.9* 31.0* 25.0*   CR 0.97* 1.05* 1.34*   ANIONGAP 9 10 12   BASHIR 8.6* 8.7* 9.5   GLC 93 108* 121*     Most Recent 2 LFT's:  Recent Labs   Lab Test 10/26/23  1240   AST 38   ALT 27   ALKPHOS 62   BILITOTAL 0.4       Results for orders placed or performed during the hospital encounter of 06/15/25   XR Pelvis and Hip Left 1 View    Narrative    EXAM: XR PELVIS AND HIP LEFT 1 VIEW  LOCATION: [facility]  DATE: 6/15/2025    INDICATION: Left hip pain, sudden onset, suspected gluteus mark rupture.  COMPARISON: X-ray pelvis AP view with left hip single view 8/21/2024.      Impression    IMPRESSION: Degenerative changes lower lumbar spine and joints of the pelvis including the left hip joint. No acute fracture or dislocation. New onset soft tissue swelling in the left gluteal region. If there is continued clinical concern for soft tissue   injury, consider dedicated MR for further assessment, if technically feasible.   MR Pelvis Muscular Tissue wo Contrast    Narrative    EXAM: MR PELVIS MUSCULAR TISSUE W/O CONTRAST  LOCATION: [facility]  DATE: 6/16/2025    INDICATION: left gluteal tear  COMPARISON: X-rays Jeannie 15, 2025 August 21, 2024.  TECHNIQUE: Unenhanced.    FINDINGS:     Normal marrow signal throughout the pelvis and proximal femurs. Normal volume of fluid at the hips. Chronic appearing arthritic change  at the sacroiliac joints.    Soft tissue edema throughout the left gluteus maximum middle and inferior fibers which remain intact. There is also edema along the obturator internus muscle and left pelvic sidewall.    Heterogeneous high T2 low T1 signal collection directly caudad to the inferior ramus at the left hamstring origin measures 6 x 7 cm axial 7 cm craniocaudad and is suspected to represent hematoma.    Hamstring origin appears avulsed and distally retracted about 6 cm.    Fatty atrophic change of the gluteus minimus muscles bilaterally in the left gluteus medius.    Advanced degenerative change in the lower lumbar spine. Sigmoid diverticulosis.      Impression    IMPRESSION:  1.  Acute avulsion of the left hamstring origin with distal retraction and a 7 x 6 x 7 cm hematoma caudad to the inferior ramus.

## 2025-06-17 NOTE — PLAN OF CARE
Physical Therapy Discharge Summary    Reason for therapy discharge:    Discharged to home with outpatient therapy.    Progress towards therapy goal(s). See goals on Care Plan in [facility] electronic health record for goal details.  Goals partially met.  Barriers to achieving goals:   discharge on same date as initial evaluation.    Therapy recommendation(s):    Continued therapy is recommended.  Rationale/Recommendations:  Anticipate Pt near baseline mobility. Ambulating w/ SEC and SBA. Reports goes to OP PT every x2 weeks. Anticipate when medically ready Pt can return to home w/ assistance from spouse and OP PT.  PT Brief overview of current status: Goals of therapy will be to address safe mobility and make recs for d/c to next level of care. Pt and RN will continue to follow all falls risk precautions as documented by RN staff while hospitalized  PT Total Distance Amb During Session (feet): 350    Recommendation above provided by last treating therapist.

## 2025-06-17 NOTE — PLAN OF CARE
DATE & SHIFT: 06/16/25 6235-0567  PRIMARY Concern: Left hamstring avulsion   SAFETY RISK Concerns (fall risk, behaviors, etc.): Fall      Aggression Tool Color: Green  Isolation/Type: Noen   Tests/Procedures for NEXT shift: None   Consults? (Pending/following, signed-off?) Ortho and PT  Where is patient from? (Home, TCU, etc.): Home   Other Important info for NEXT shift: Patient does not take opioids for pain; only tylenol and muscle relaxers as needed.  Anticipated DC date & active delays: possible discharge 6/17  SUMMARY NOTE:   Orientation/Cognitive: AOx4  Observation Goals (Met/ Not Met): Not met   Mobility Level/Assist Equipment: A1 GB/W  Pain Management: complained of pain Schedule Robaxin given.   Complete Pain Reassessment: Y/N Y Due next: Next shift   Tele/VS/O2: VSS on RA  ABNL Lab/BG: UN 25.9, creatinine 0.97. GFR 59. Hemoglobin 10.1  Diet: Regular   Bowel/Bladder: Continent, up to BSC  Skin Concerns: Small bruise on coccyx  Drains/Devices: PIV SL  Patient Stated Goal for Today: Sleep/ Pain management